# Patient Record
Sex: MALE | Race: BLACK OR AFRICAN AMERICAN | Employment: OTHER | URBAN - METROPOLITAN AREA
[De-identification: names, ages, dates, MRNs, and addresses within clinical notes are randomized per-mention and may not be internally consistent; named-entity substitution may affect disease eponyms.]

---

## 2017-07-18 ENCOUNTER — HOSPITAL ENCOUNTER (INPATIENT)
Age: 72
LOS: 2 days | Discharge: ANOTHER HOSPITAL | DRG: 054 | End: 2017-07-20
Attending: HOSPITALIST | Admitting: HOSPITALIST
Payer: MEDICARE

## 2017-07-18 ENCOUNTER — APPOINTMENT (OUTPATIENT)
Dept: MRI IMAGING | Age: 72
DRG: 054 | End: 2017-07-18
Attending: NURSE PRACTITIONER
Payer: MEDICARE

## 2017-07-18 PROBLEM — C18.9 COLON CANCER METASTASIZED TO BRAIN (HCC): Status: ACTIVE | Noted: 2017-07-18

## 2017-07-18 PROBLEM — R50.9 FEVER: Status: ACTIVE | Noted: 2017-07-18

## 2017-07-18 PROBLEM — R09.02 HYPOXIA: Status: ACTIVE | Noted: 2017-07-18

## 2017-07-18 PROBLEM — C79.31 COLON CANCER METASTASIZED TO BRAIN (HCC): Status: ACTIVE | Noted: 2017-07-18

## 2017-07-18 PROBLEM — C18.9 METASTATIC COLON CANCER IN FEMALE (HCC): Status: ACTIVE | Noted: 2017-07-18

## 2017-07-18 LAB
ALBUMIN SERPL BCP-MCNC: 2.4 G/DL (ref 3.5–5)
ALBUMIN/GLOB SERPL: 0.6 {RATIO} (ref 1.1–2.2)
ALP SERPL-CCNC: 140 U/L (ref 45–117)
ALT SERPL-CCNC: 79 U/L (ref 12–78)
ANION GAP BLD CALC-SCNC: 8 MMOL/L (ref 5–15)
AST SERPL W P-5'-P-CCNC: 76 U/L (ref 15–37)
ATRIAL RATE: 94 BPM
BASOPHILS # BLD AUTO: 0 K/UL (ref 0–0.1)
BASOPHILS # BLD: 0 % (ref 0–1)
BILIRUB SERPL-MCNC: 0.4 MG/DL (ref 0.2–1)
BUN SERPL-MCNC: 9 MG/DL (ref 6–20)
BUN/CREAT SERPL: 11 (ref 12–20)
CALCIUM SERPL-MCNC: 9 MG/DL (ref 8.5–10.1)
CALCULATED R AXIS, ECG10: 175 DEGREES
CALCULATED T AXIS, ECG11: 155 DEGREES
CHLORIDE SERPL-SCNC: 98 MMOL/L (ref 97–108)
CO2 SERPL-SCNC: 29 MMOL/L (ref 21–32)
CREAT SERPL-MCNC: 0.83 MG/DL (ref 0.7–1.3)
DIAGNOSIS, 93000: NORMAL
DIFFERENTIAL METHOD BLD: ABNORMAL
EOSINOPHIL # BLD: 0 K/UL (ref 0–0.4)
EOSINOPHIL NFR BLD: 0 % (ref 0–7)
ERYTHROCYTE [DISTWIDTH] IN BLOOD BY AUTOMATED COUNT: 14.1 % (ref 11.5–14.5)
GLOBULIN SER CALC-MCNC: 4.2 G/DL (ref 2–4)
GLUCOSE BLD STRIP.AUTO-MCNC: 293 MG/DL (ref 65–100)
GLUCOSE BLD STRIP.AUTO-MCNC: 311 MG/DL (ref 65–100)
GLUCOSE BLD STRIP.AUTO-MCNC: 317 MG/DL (ref 65–100)
GLUCOSE BLD STRIP.AUTO-MCNC: 326 MG/DL (ref 65–100)
GLUCOSE BLD STRIP.AUTO-MCNC: 329 MG/DL (ref 65–100)
GLUCOSE BLD STRIP.AUTO-MCNC: 338 MG/DL (ref 65–100)
GLUCOSE SERPL-MCNC: 281 MG/DL (ref 65–100)
HCT VFR BLD AUTO: 28.5 % (ref 36.6–50.3)
HGB BLD-MCNC: 9.7 G/DL (ref 12.1–17)
INR PPP: 2.4 (ref 0.9–1.1)
LYMPHOCYTES # BLD AUTO: 3 % (ref 12–49)
LYMPHOCYTES # BLD: 0.4 K/UL (ref 0.8–3.5)
MCH RBC QN AUTO: 30.6 PG (ref 26–34)
MCHC RBC AUTO-ENTMCNC: 34 G/DL (ref 30–36.5)
MCV RBC AUTO: 89.9 FL (ref 80–99)
METAMYELOCYTES NFR BLD MANUAL: 1 %
MONOCYTES # BLD: 0.4 K/UL (ref 0–1)
MONOCYTES NFR BLD AUTO: 3 % (ref 5–13)
MYELOCYTES NFR BLD MANUAL: 4 %
NEUTS BAND NFR BLD MANUAL: 6 % (ref 0–6)
NEUTS SEG # BLD: 10.7 K/UL (ref 1.8–8)
NEUTS SEG NFR BLD AUTO: 83 % (ref 32–75)
P-R INTERVAL, ECG05: 122 MS
PLATELET # BLD AUTO: 221 K/UL (ref 150–400)
POTASSIUM SERPL-SCNC: 4.2 MMOL/L (ref 3.5–5.1)
PROT SERPL-MCNC: 6.6 G/DL (ref 6.4–8.2)
PROTHROMBIN TIME: 24.9 SEC (ref 9–11.1)
Q-T INTERVAL, ECG07: 336 MS
QRS DURATION, ECG06: 80 MS
QTC CALCULATION (BEZET), ECG08: 420 MS
RBC # BLD AUTO: 3.17 M/UL (ref 4.1–5.7)
RBC MORPH BLD: ABNORMAL
RBC MORPH BLD: ABNORMAL
SERVICE CMNT-IMP: ABNORMAL
SODIUM SERPL-SCNC: 135 MMOL/L (ref 136–145)
VENTRICULAR RATE, ECG03: 94 BPM
WBC # BLD AUTO: 12 K/UL (ref 4.1–11.1)

## 2017-07-18 PROCEDURE — 85025 COMPLETE CBC W/AUTO DIFF WBC: CPT | Performed by: HOSPITALIST

## 2017-07-18 PROCEDURE — 97116 GAIT TRAINING THERAPY: CPT

## 2017-07-18 PROCEDURE — 74011636637 HC RX REV CODE- 636/637: Performed by: HOSPITALIST

## 2017-07-18 PROCEDURE — 93005 ELECTROCARDIOGRAM TRACING: CPT

## 2017-07-18 PROCEDURE — 97161 PT EVAL LOW COMPLEX 20 MIN: CPT

## 2017-07-18 PROCEDURE — 74011250636 HC RX REV CODE- 250/636: Performed by: HOSPITALIST

## 2017-07-18 PROCEDURE — 74011000258 HC RX REV CODE- 258: Performed by: HOSPITALIST

## 2017-07-18 PROCEDURE — 93306 TTE W/DOPPLER COMPLETE: CPT

## 2017-07-18 PROCEDURE — 97166 OT EVAL MOD COMPLEX 45 MIN: CPT

## 2017-07-18 PROCEDURE — 65660000000 HC RM CCU STEPDOWN

## 2017-07-18 PROCEDURE — 80053 COMPREHEN METABOLIC PANEL: CPT | Performed by: HOSPITALIST

## 2017-07-18 PROCEDURE — 85610 PROTHROMBIN TIME: CPT | Performed by: HOSPITALIST

## 2017-07-18 PROCEDURE — 87040 BLOOD CULTURE FOR BACTERIA: CPT | Performed by: HOSPITALIST

## 2017-07-18 PROCEDURE — 74011000250 HC RX REV CODE- 250: Performed by: HOSPITALIST

## 2017-07-18 PROCEDURE — 36415 COLL VENOUS BLD VENIPUNCTURE: CPT | Performed by: HOSPITALIST

## 2017-07-18 PROCEDURE — 74011250637 HC RX REV CODE- 250/637: Performed by: NURSE PRACTITIONER

## 2017-07-18 PROCEDURE — 74011250637 HC RX REV CODE- 250/637: Performed by: HOSPITALIST

## 2017-07-18 PROCEDURE — 82962 GLUCOSE BLOOD TEST: CPT

## 2017-07-18 PROCEDURE — 97535 SELF CARE MNGMENT TRAINING: CPT

## 2017-07-18 RX ORDER — MAGNESIUM SULFATE 100 %
4 CRYSTALS MISCELLANEOUS AS NEEDED
Status: DISCONTINUED | OUTPATIENT
Start: 2017-07-18 | End: 2017-07-21 | Stop reason: HOSPADM

## 2017-07-18 RX ORDER — IPRATROPIUM BROMIDE AND ALBUTEROL SULFATE 2.5; .5 MG/3ML; MG/3ML
3 SOLUTION RESPIRATORY (INHALATION)
Status: DISCONTINUED | OUTPATIENT
Start: 2017-07-18 | End: 2017-07-21 | Stop reason: HOSPADM

## 2017-07-18 RX ORDER — DOCUSATE SODIUM 100 MG/1
100 CAPSULE, LIQUID FILLED ORAL 2 TIMES DAILY
Status: DISCONTINUED | OUTPATIENT
Start: 2017-07-18 | End: 2017-07-21 | Stop reason: HOSPADM

## 2017-07-18 RX ORDER — DILTIAZEM HYDROCHLORIDE 120 MG/1
120 CAPSULE, COATED, EXTENDED RELEASE ORAL DAILY
Status: DISCONTINUED | OUTPATIENT
Start: 2017-07-18 | End: 2017-07-21 | Stop reason: HOSPADM

## 2017-07-18 RX ORDER — WARFARIN 1 MG/1
1 TABLET ORAL DAILY
COMMUNITY

## 2017-07-18 RX ORDER — VANCOMYCIN/0.9 % SOD CHLORIDE 1.5G/250ML
1500 PLASTIC BAG, INJECTION (ML) INTRAVENOUS EVERY 12 HOURS
Status: DISCONTINUED | OUTPATIENT
Start: 2017-07-18 | End: 2017-07-20

## 2017-07-18 RX ORDER — INSULIN GLARGINE 100 [IU]/ML
10 INJECTION, SOLUTION SUBCUTANEOUS
Status: DISCONTINUED | OUTPATIENT
Start: 2017-07-18 | End: 2017-07-19

## 2017-07-18 RX ORDER — CARVEDILOL 12.5 MG/1
25 TABLET ORAL 2 TIMES DAILY WITH MEALS
Status: DISCONTINUED | OUTPATIENT
Start: 2017-07-18 | End: 2017-07-21 | Stop reason: HOSPADM

## 2017-07-18 RX ORDER — DEXAMETHASONE 4 MG/1
4 TABLET ORAL 2 TIMES DAILY WITH MEALS
COMMUNITY

## 2017-07-18 RX ORDER — LANOLIN ALCOHOL/MO/W.PET/CERES
1 CREAM (GRAM) TOPICAL
Status: DISCONTINUED | OUTPATIENT
Start: 2017-07-18 | End: 2017-07-21 | Stop reason: HOSPADM

## 2017-07-18 RX ORDER — ENOXAPARIN SODIUM 100 MG/ML
1 INJECTION SUBCUTANEOUS EVERY 12 HOURS
Status: DISCONTINUED | OUTPATIENT
Start: 2017-07-18 | End: 2017-07-18

## 2017-07-18 RX ORDER — SODIUM CHLORIDE 0.9 % (FLUSH) 0.9 %
5-10 SYRINGE (ML) INJECTION AS NEEDED
Status: DISCONTINUED | OUTPATIENT
Start: 2017-07-18 | End: 2017-07-21 | Stop reason: HOSPADM

## 2017-07-18 RX ORDER — LEVETIRACETAM 500 MG/1
500 TABLET ORAL 2 TIMES DAILY
COMMUNITY

## 2017-07-18 RX ORDER — SODIUM CHLORIDE 0.9 % (FLUSH) 0.9 %
5-10 SYRINGE (ML) INJECTION EVERY 8 HOURS
Status: DISCONTINUED | OUTPATIENT
Start: 2017-07-18 | End: 2017-07-21 | Stop reason: HOSPADM

## 2017-07-18 RX ORDER — AMLODIPINE BESYLATE 10 MG/1
10 TABLET ORAL DAILY
COMMUNITY

## 2017-07-18 RX ORDER — DEXAMETHASONE SODIUM PHOSPHATE 4 MG/ML
4 INJECTION, SOLUTION INTRA-ARTICULAR; INTRALESIONAL; INTRAMUSCULAR; INTRAVENOUS; SOFT TISSUE EVERY 8 HOURS
Status: DISCONTINUED | OUTPATIENT
Start: 2017-07-18 | End: 2017-07-21 | Stop reason: HOSPADM

## 2017-07-18 RX ORDER — ONDANSETRON 4 MG/1
4 TABLET, ORALLY DISINTEGRATING ORAL
Status: DISCONTINUED | OUTPATIENT
Start: 2017-07-18 | End: 2017-07-21 | Stop reason: HOSPADM

## 2017-07-18 RX ORDER — SODIUM CHLORIDE 9 MG/ML
75 INJECTION, SOLUTION INTRAVENOUS CONTINUOUS
Status: DISCONTINUED | OUTPATIENT
Start: 2017-07-18 | End: 2017-07-21 | Stop reason: HOSPADM

## 2017-07-18 RX ORDER — DEXTROSE 50 % IN WATER (D50W) INTRAVENOUS SYRINGE
12.5-25 AS NEEDED
Status: DISCONTINUED | OUTPATIENT
Start: 2017-07-18 | End: 2017-07-18 | Stop reason: CLARIF

## 2017-07-18 RX ORDER — INSULIN LISPRO 100 [IU]/ML
INJECTION, SOLUTION INTRAVENOUS; SUBCUTANEOUS
Status: DISCONTINUED | OUTPATIENT
Start: 2017-07-18 | End: 2017-07-21 | Stop reason: HOSPADM

## 2017-07-18 RX ORDER — LOVASTATIN 20 MG/1
10 TABLET ORAL DAILY
Status: DISCONTINUED | OUTPATIENT
Start: 2017-07-18 | End: 2017-07-21 | Stop reason: HOSPADM

## 2017-07-18 RX ORDER — HYDROCHLOROTHIAZIDE 25 MG/1
12.5 TABLET ORAL DAILY
Status: DISCONTINUED | OUTPATIENT
Start: 2017-07-18 | End: 2017-07-21 | Stop reason: HOSPADM

## 2017-07-18 RX ORDER — LEVETIRACETAM 500 MG/1
500 TABLET ORAL 2 TIMES DAILY
Status: DISCONTINUED | OUTPATIENT
Start: 2017-07-18 | End: 2017-07-21 | Stop reason: HOSPADM

## 2017-07-18 RX ORDER — HYDROCODONE BITARTRATE AND ACETAMINOPHEN 5; 325 MG/1; MG/1
1 TABLET ORAL
Status: DISCONTINUED | OUTPATIENT
Start: 2017-07-18 | End: 2017-07-21 | Stop reason: HOSPADM

## 2017-07-18 RX ORDER — LISINOPRIL 20 MG/1
20 TABLET ORAL DAILY
Status: DISCONTINUED | OUTPATIENT
Start: 2017-07-18 | End: 2017-07-21 | Stop reason: HOSPADM

## 2017-07-18 RX ORDER — ACETAMINOPHEN 325 MG/1
650 TABLET ORAL
Status: DISCONTINUED | OUTPATIENT
Start: 2017-07-18 | End: 2017-07-21 | Stop reason: HOSPADM

## 2017-07-18 RX ORDER — VANCOMYCIN 2 GRAM/500 ML IN 0.9 % SODIUM CHLORIDE INTRAVENOUS
2000 ONCE
Status: COMPLETED | OUTPATIENT
Start: 2017-07-18 | End: 2017-07-18

## 2017-07-18 RX ADMIN — PIPERACILLIN SODIUM,TAZOBACTAM SODIUM 3.38 G: 3; .375 INJECTION, POWDER, FOR SOLUTION INTRAVENOUS at 07:07

## 2017-07-18 RX ADMIN — VANCOMYCIN HYDROCHLORIDE 1500 MG: 10 INJECTION, POWDER, LYOPHILIZED, FOR SOLUTION INTRAVENOUS at 20:10

## 2017-07-18 RX ADMIN — DILTIAZEM HYDROCHLORIDE 120 MG: 120 CAPSULE, EXTENDED RELEASE ORAL at 08:42

## 2017-07-18 RX ADMIN — SODIUM CHLORIDE 75 ML/HR: 900 INJECTION, SOLUTION INTRAVENOUS at 06:59

## 2017-07-18 RX ADMIN — LOVASTATIN 10 MG: 20 TABLET ORAL at 08:03

## 2017-07-18 RX ADMIN — INSULIN LISPRO 7 UNITS: 100 INJECTION, SOLUTION INTRAVENOUS; SUBCUTANEOUS at 07:37

## 2017-07-18 RX ADMIN — CARVEDILOL 25 MG: 12.5 TABLET, FILM COATED ORAL at 17:02

## 2017-07-18 RX ADMIN — LISINOPRIL 20 MG: 20 TABLET ORAL at 08:03

## 2017-07-18 RX ADMIN — PIPERACILLIN SODIUM,TAZOBACTAM SODIUM 3.38 G: 3; .375 INJECTION, POWDER, FOR SOLUTION INTRAVENOUS at 13:27

## 2017-07-18 RX ADMIN — DEXAMETHASONE SODIUM PHOSPHATE 4 MG: 4 INJECTION INTRA-ARTICULAR; INTRALESIONAL; INTRAMUSCULAR; INTRAVENOUS; SOFT TISSUE at 13:33

## 2017-07-18 RX ADMIN — DEXAMETHASONE SODIUM PHOSPHATE 4 MG: 4 INJECTION INTRA-ARTICULAR; INTRALESIONAL; INTRAMUSCULAR; INTRAVENOUS; SOFT TISSUE at 21:49

## 2017-07-18 RX ADMIN — LEVETIRACETAM 500 MG: 500 TABLET ORAL at 17:02

## 2017-07-18 RX ADMIN — HYDROCHLOROTHIAZIDE 12.5 MG: 25 TABLET ORAL at 10:39

## 2017-07-18 RX ADMIN — CARVEDILOL 25 MG: 12.5 TABLET, FILM COATED ORAL at 10:39

## 2017-07-18 RX ADMIN — VANCOMYCIN HYDROCHLORIDE 2000 MG: 10 INJECTION, POWDER, LYOPHILIZED, FOR SOLUTION INTRAVENOUS at 08:22

## 2017-07-18 RX ADMIN — DEXAMETHASONE SODIUM PHOSPHATE 4 MG: 4 INJECTION INTRA-ARTICULAR; INTRALESIONAL; INTRAMUSCULAR; INTRAVENOUS; SOFT TISSUE at 07:08

## 2017-07-18 RX ADMIN — INSULIN LISPRO 7 UNITS: 100 INJECTION, SOLUTION INTRAVENOUS; SUBCUTANEOUS at 17:01

## 2017-07-18 RX ADMIN — INSULIN LISPRO 4 UNITS: 100 INJECTION, SOLUTION INTRAVENOUS; SUBCUTANEOUS at 21:48

## 2017-07-18 RX ADMIN — INSULIN LISPRO 7 UNITS: 100 INJECTION, SOLUTION INTRAVENOUS; SUBCUTANEOUS at 12:03

## 2017-07-18 RX ADMIN — DOCUSATE SODIUM 100 MG: 100 CAPSULE, LIQUID FILLED ORAL at 08:42

## 2017-07-18 RX ADMIN — LEVETIRACETAM 500 MG: 500 TABLET ORAL at 08:03

## 2017-07-18 RX ADMIN — FERROUS SULFATE TAB 325 MG (65 MG ELEMENTAL FE) 325 MG: 325 (65 FE) TAB at 08:42

## 2017-07-18 RX ADMIN — INSULIN GLARGINE 10 UNITS: 100 INJECTION, SOLUTION SUBCUTANEOUS at 21:48

## 2017-07-18 RX ADMIN — DILTIAZEM HYDROCHLORIDE 10 MG/HR: 5 INJECTION, SOLUTION INTRAVENOUS at 06:53

## 2017-07-18 RX ADMIN — PIPERACILLIN SODIUM,TAZOBACTAM SODIUM 3.38 G: 3; .375 INJECTION, POWDER, FOR SOLUTION INTRAVENOUS at 21:48

## 2017-07-18 NOTE — PROGRESS NOTES
TRANSFER - IN REPORT:    Verbal report received from [facility], RN(name) on Brandy Showers  being received from [place](unit) for urgent transfer      Report consisted of patients Situation, Background, Assessment and   Recommendations(SBAR). Information from the following report(s) SBAR, ED Summary, [facility] and Recent Results was reviewed with the receiving nurse. Opportunity for questions and clarification was provided. Assessment will be completed upon patients arrival to unit and care will be assumed.

## 2017-07-18 NOTE — PROGRESS NOTES
CM met with patient and wife at bedside- patient is transfer from [facility] - lives with wife- they have 5 children and 2 of them live locally - patient has only Medicare coverage - no prescription coverage and no secondary insurance - is open to [employer] for skilled nursing services- per PT/OT patient may benefit from rehab stay upon discharge from the hospital - provided inpt rehab listing and SNF listing to wife and L[place] , daughter - she would like to speak with the MD regarding input whether they would recommend inpt rehab vs SNF rehab - wife provides transportation needs for patient. NESHA Fontenot    Care Management Interventions  PCP Verified by CM:  Yes  Transition of Care Consult (CM Consult):  (inpt rehab vs SNF rehab)  MyChart Signup: Yes  Discharge Durable Medical Equipment: No (uses a cane for ambulation)  Physical Therapy Consult: Yes  Occupational Therapy Consult: Yes  Speech Therapy Consult: No  Current Support Network: Lives with Spouse, Own Home  Confirm Follow Up Transport: Family  Plan discussed with Pt/Family/Caregiver: Yes  Freedom of Choice Offered: Yes  Discharge Location  Discharge Placement: Rehab hospital/unit acute (Rehab vs SNF)

## 2017-07-18 NOTE — CONSULTS
Cardiology Consult Note      Patient Name: Teetee Cedeno  : 1945 MRN: [id number]  Date: 2017  Time: 9:36 AM    Admit Diagnosis: SVT/Hypoxia/PNA  Hypoxia    Primary Cardiologist: None    Consulting Cardiologist: Osei Pérez M.D.    Reason for Consult: SVT now ST after cardizem gtt    Requesting MD: Dr. Miley Hoffmann MD  HPI:  Teetee Cedeno is a 80 y.o. male admitted on 2017  for SVT/Hypoxia/PNA  Hypoxia. Pt has PMH of metastatic colon cancer to the kidney, liver and brain status post surgery, radiation and chemotherapy, diabetes mellitus type 2,hypertension, seizures, left leg DVT on Coumadin and hypercholesterolemia. He was directly   admitted from [facility] for hypoxia and fever. His wife brought him to hospital originally due to pt starting to have left-sided weakness and could not walk with onset the evening of . He was also confused and weak. The patient denied any cough, left-sided chest pain, palpitations, nausea, vomiting, abdominal pain, urinary complaints or shortness of breath. Reported that he lost 6 pounds over 6 months. At [facility] he was noted to have supraventricular tachycardia. (rate appears to have been as high as 200 according to vital sign records) Carotid massage was unsuccessful. He received adenosine and 2 boluses of Cardizem 20 mg. Despite that, did not resolve and he was started on Cardizem drip and converted to sinus tachycardia. He also had a CT scan, which showed right parietal 2.8 cm enhancing lesion. Neurosurgery is following. MRI brain planned. He was given IV Zosyn, oxygen support, breathing treatment, and transferred to [facility].  Cardiology is consulted due to previous SVT. Subjective:  Pt currently denies any chest pain, SOB, palpitations, chills, N&V.   States he never has any chest pain, does get SOB with walking to bathroom but not walking much due to extreme fatigue. Does report he has palpitations from time to time. No dizziness. Reports noticeable BLE edema over last few months. No dysphagia but has poor appetite. No numbness or tingling. Denies abdominal pain. Last BM yesterday. Wife at bedside cares for him at home. States he has no past cardiac history except hypertension. Does not have a cardiologist.  Last chemotherapy was in 2016 and completed last radiation treatment in May 2017. Quit smoking 30 years ago. Assessment and Plan     1. Hx of SVT- at [address] resolved s/p cardizem gtt. Now NSR on telemetry- HR 90's.   -Continue cardizem gtt for today (rate currently at 10 mg/hr)  -Continue cardizem  mg daily  -Add Coreg 25 mg BID  -Continue telemetry monitoring.  -Will follow up TTE ordered for today. 2. Hx of HTN:  Uncontrolled. -Continue Cardizem CD as above  -Continue Lisinopril 20 mg daily  -Add Coreg 25 mg BID  -Add HCTZ 12.5 mg daily    3. Hx of HLD:  -Continue Mevacor 10 mg daily    3. Hx of LLE DVT:    -On lovenox 1 mg/kg sub q (coumadin on hold for now in case a procedure needed)  INR=2.3    4. Hx of DM type II: uncontrolled. -Management per primary team.    5. Metastatic colon cancer: (mets to brain and liver). On decadron. 6. Leukocytosis/possible sepsis:  WBC 12. T max 100.6 today. Antibiotics and management per primary team.    7. Anemia:  hgb 9.7    8. Advanced directives: pt has no advance directives per chart. Currently full code status. Pt with SVT at [address], converted to ST/NSR after cardizem gtt. Pt also with uncontrolled HTN- continue Cardizem gtt for today, Continue Cardizem CD and lisinopril, add Coreg and HCTZ. Will follow up TTE. Cardiology Attending:Patient seen and examined. I agree with NP assessment and plans. Adding meds as above.     Viki Liriano MD 7/18/2017 12:16 PM       Review of Symptoms:  Pertinent items are noted in HPI. and subjective    Previous treatment/evaluation includes none found . Cardiac risk factors: smoking/ tobacco exposure, dyslipidemia, diabetes mellitus, obesity, sedentary life style, male gender, hypertension. Past Medical hx:  Metastatic colon cancer (brain and liver)  HTN  HLD  LLE DVT  Seizures    Past surgical hx:  Rt mediport implant.     Current Facility-Administered Medications   Medication Dose Route Frequency    lisinopril (PRINIVIL, ZESTRIL) tablet 20 mg  20 mg Oral DAILY    dilTIAZem CD (CARDIZEM CD) capsule 120 mg  120 mg Oral DAILY    ferrous sulfate tablet 325 mg  1 Tab Oral DAILY WITH BREAKFAST    lovastatin (MEVACOR) tablet 10 mg  10 mg Oral DAILY    levETIRAcetam (KEPPRA) tablet 500 mg  500 mg Oral BID    dexamethasone (DECADRON) 4 mg/mL injection 4 mg  4 mg IntraVENous Q8H    piperacillin-tazobactam (ZOSYN) 3.375 g in 0.9% sodium chloride (MBP/ADV) 100 mL  3.375 g IntraVENous Q8H    insulin lispro (HUMALOG) injection   SubCUTAneous AC&HS    glucose chewable tablet 16 g  4 Tab Oral PRN    glucagon (GLUCAGEN) injection 1 mg  1 mg IntraMUSCular PRN    0.9% sodium chloride infusion  75 mL/hr IntraVENous CONTINUOUS    dilTIAZem (CARDIZEM) 125 mg in dextrose 5% 125 mL infusion  10 mg/hr IntraVENous TITRATE    acetaminophen (TYLENOL) tablet 650 mg  650 mg Oral Q4H PRN    HYDROcodone-acetaminophen (NORCO) 5-325 mg per tablet 1 Tab  1 Tab Oral Q4H PRN    ondansetron (ZOFRAN ODT) tablet 4 mg  4 mg Oral Q4H PRN    docusate sodium (COLACE) capsule 100 mg  100 mg Oral BID    albuterol-ipratropium (DUO-NEB) 2.5 MG-0.5 MG/3 ML  3 mL Nebulization Q4H PRN    dextrose 10 % infusion 125-250 mL  125-250 mL IntraVENous PRN    piperacillin-tazobactam (ZOSYN) 3.375 g in 0.9% sodium chloride (MBP/ADV) 100 mL  3.375 g IntraVENous NOW    vancomycin (VANCOCIN) 2000 mg in  ml infusion  2,000 mg IntraVENous ONCE    vancomycin dosing by pharmacy   Other Rx Dosing/Monitoring    carvedilol (COREG) tablet 25 mg  25 mg Oral BID WITH MEALS    hydroCHLOROthiazide (HYDRODIURIL) tablet 12.5 mg  12.5 mg Oral DAILY       No Known Allergies   No family history on file. Social History     Social History    Marital status:      Spouse name: N/A    Number of children: N/A    Years of education: N/A     Social History Main Topics    Smoking status: Not on file    Smokeless tobacco: Not on file    Alcohol use Not on file    Drug use: Not on file    Sexual activity: Not on file     Other Topics Concern    Not on file     Social History Narrative     Social hx:  Former smoker- quit 30 years ago  No ETOH use-  Lives with wife who cares for him at home. 5 children, 1  from leukemia    Family hx:  Mother/father- he is unaware of their PMH- no known heart disease per his wife. No family hx of cardiac disease    Objective:    Physical Exam    Vitals:   Vitals:    17 0503 17 0554 17 0742   BP: (!) 173/99  (!) 184/105   Pulse: (!) 104  95   Resp: 16  18   Temp: (!) 100.6 °F (38.1 °C)  99.7 °F (37.6 °C)   SpO2: 98%  99%   Weight:  114.7 kg (252 lb 13.9 oz)        General:    Alert, cooperative, no distress, appears stated age. Ears:  [place]   Neck:   Supple,  no JVD. Back:     Not examined    Lungs:     Few faint crackles bilateral bases, no use of accessory muscles noted. Heart[de-identified]    Regular rate and rhythm, S1, S2 normal, no murmur, click, rub or gallop. Abdomen:    Obese, Soft, non-tender. Bowel sounds present. No masses,  No      organomegaly. Extremities:   1+ BLE edema   Vascular:   Pulses - 1+ Rt DP, 2+ Left DP. Skin:   Skin color normal. No rashes or lesions   Neurologic:   Disoriented to place.         Telemetry: NSR- HR 90's    ECG: Today: 2017 06:14:37 [address]   Suspect arm lead reversal, interpretation assumes no reversal  Sinus rhythm with occasional premature ventricular complexes  Lateral infarct , age undetermined  No previous ECGs available  Confirmed by Vivek Gregorio MD, Monserrat ([id number]) on 7/18/2017 8:35:17 AM  Data Review:     Radiology:   No results for input(s): CPK, TROIQ in the last 72 hours. No lab exists for component: CKQMB, CPKMB, BMPP  Recent Labs      07/18/17   0645   NA  135*   K  4.2   CL  98   CO2  29   BUN  9   CREA  0.83   GLU  281*   CA  9.0     Recent Labs      07/18/17   0645   WBC  12.0*   HGB  9.7*   HCT  28.5*   PLT  221     Recent Labs      07/18/17   0645   PTP  24.9*   INR  2.4*   SGOT  76*   AP  140*     No results for input(s): CHOL, LDLC in the last 72 hours. No lab exists for component: TGL, HDLC,  HBA1C  No results for input(s): CRP, TSH, TSHEXT in the last 72 hours. No lab exists for component: ESR    Thank you very much for this referral. I appreciate the opportunity to participate in this patient's care. I will follow along with above stated plan. Kel Trejo.  JOHANN Samuel         [facility] [address] [place] [address] Rd[address] [facility] [address], [address] [facility] [address]     [place]     [phone]    Tisha Ornelas MD

## 2017-07-18 NOTE — PROGRESS NOTES
Pharmacist Note - Vancomycin Dosing    Consult provided for this 80 y.o. male for indication of fever and hypoxia  -admitted from [facility] ED  -metastatic colon cancer to the kidney, liver and brain status post surgery, radiation and chemotherapy    Antibiotic regimen: Vancomycin and Zosyn    Recent Labs      17   0645   WBC  12.0*   CREA  0.83   BUN  9     Frequency of BMP: daily thru   Height: 185 cm (per prior records)  Weight: 114.7 kg  Est CrCl: ~95 ml/min; UO: - ml/kg/hr  Temp (24hrs), Av.2 °F (37.9 °C), Min:99.7 °F (37.6 °C), Max:100.6 °F (38.1 °C)    Cultures:   blood - pending    Goal trough = 15 - 20 mcg/mL    Therapy will be initiated with a loading dose of 2000 mg IV x 1 to be followed by a maintenance dose of 1500 mg IV every 12 hours. Pharmacy to follow patient daily and order levels / make dose adjustments as appropriate.

## 2017-07-18 NOTE — PROGRESS NOTES
Full note to follow  Left kuldeep neglect  Radiated to brain earlier this year according to wife  Will await MRI.  Surgery may be best option to treat brain disease if they wish to proceed with that option  Will need to be off coumadin first

## 2017-07-18 NOTE — PROGRESS NOTES
Spiritual Care Assessment/Progress Notes    Sabine Pickard [id number]  [id number]    1945  80 y.o.  male    Patient Telephone Number: [phone] (home)   Baptism Affiliation: Sanaz Rosen   Language: English   Extended Emergency Contact Information  Primary Emergency Contact: Abdifatah Anton  Address: [address] Phone: [phone]  Relation: Spouse   Patient Active Problem List    Diagnosis Date Noted    Hypoxia 07/18/2017    Colon cancer metastasized to brain [facility]) 07/18/2017    Fever 07/18/2017        Date: 7/18/2017       Level of Baptism/Spiritual Activity:  [x]         Involved in munir tradition/spiritual practice    []         Not involved in munir tradition/spiritual practice  [x]         Spiritually oriented    []         Claims no spiritual orientation    []         seeking spiritual identity  []         Feels alienated from Uatsdin practice/tradition  []         Feels angry about Uatsdin practice/tradition  [x]         Spirituality/Uatsdin tradition IS a resource for coping at this time.   []         Not able to assess due to medical condition    Services Provided Today:  []         crisis intervention    []         reading Scriptures  [x]         spiritual assessment    []         prayer  [x]         empathic listening/emotional support  []         rites and rituals (cite in comments)  []         life review     []         Uatsdin support  []         theological development   []         advocacy  []         ethical dialog     []         blessing  []         bereavement support    [x]         support to family  []         anticipatory grief support   [x]         help with AMD  []         spiritual guidance    []         meditation      Spiritual Care Needs  [x]         Emotional Support  [x]         Spiritual/Baptism Care  []         Loss/Adjustment  []         Advocacy/Referral                /Ethics  []         No needs expressed at               this time  []         Other: (note in               comments)  Spiritual Care Plan  []         Follow up visits with               pt/family  []         Provide materials  []         Schedule sacraments  []         Contact Community               Clergy  [x]         Follow up as needed  []         Other: (note in               comments)     Comments: Saw Mr Lester Ibarra in room 462 in regards to an AMD consult, requested by his wife. Mr Lester Ibarra was sitting up in bed and appeared relaxed at time of 's arrival. Mr Lester Ibarra was oriented to person & year but thought that he was in [facility] 12 that it was June; he also stated that the two people with him were his daughters but it was his wife and his daughter. Informed patient's wife that patient would need to be able to fully comprehend information about an AMD in order to complete the document. Wife acknowledged that information.  then briefly reviewed with the wife the various sections of an AMD and the purpose of each. [address] [place] [address] left a copy of a blank AMD and the booklet, \"Your Right to Decide\" with the wife for her review. Encouraged her to have [address] [place] [address] notified when patient was better oriented so that AMD could be reviewed with him. Wife shared that patient was a  of [employer], that had about 30 members. Shared that he had been there about a year and the Anglican was planning a celebration for the end of July; they were hoping that he would be better by then and be able to attend. Assured patient and family of prayers on their behalf and of ongoing  availability for patient/family support as needed/able. : Rev. Abdias Grossman.  Thalia Oswald; [facility], to [facility] [phone] [facility] call: [phone]

## 2017-07-18 NOTE — PROGRESS NOTES
Spiritual Care Partner Volunteer visited patient in [place] [address] on 7/18/17. Documented by:  Pamella Farrell M.Div.    [facility] [phone])

## 2017-07-18 NOTE — PROGRESS NOTES
0500: TRANSFER - IN REPORT:    Verbal report received from [facility]) on Janet Round  being received from [facility] (unit) for routine progression of care      Report consisted of patients Situation, Background, Assessment and   Recommendations(SBAR). Information from the following report(s) SBAR and ED Summary was reviewed with the receiving nurse. Opportunity for questions and clarification was provided. Assessment completed upon patients arrival to unit and care assumed. Patient put on tele. Dr Josep ahuja. Wife, Floy Stain at bedside. No c/o pain. 0800: Bedside shift change report given to Anya Lennon (oncoming nurse) by Thea Becerril (offgoing nurse). Report included the following information SBAR, Kardex, Intake/Output, MAR and Recent Results.

## 2017-07-18 NOTE — PROGRESS NOTES
Problem: Discharge Planning  Goal: *Discharge to safe environment  Outcome: Progressing Towards Goal  See cm notes.  NESHA Hyde

## 2017-07-18 NOTE — ACP (ADVANCE CARE PLANNING)
Saw Mr Jakub Okeefe in room 462 in regards to an AMD consult, requested by his wife. Mr Jakub Okeefe was sitting up in bed and appeared relaxed at time of 's arrival. Mr Jakub Okeefe was oriented to person & year but thought that he was in [facility] 12 that it was June; he also stated that the two people with him were his daughters but it was his wife and his daughter. Informed patient's wife that patient would need to be able to fully comprehend information about an AMD in order to complete the document. Wife acknowledged that information.  then briefly reviewed with the wife the various sections of an AMD and the purpose of each. Angelica Fernandez left a copy of a blank AMD and the booklet, \"Your Right to Decide\" with the wife for her review. Encouraged her to have [facility] Arnold alvaro notified when patient was better oriented so that AMD could be reviewed with him. : Rev. Macho Quesada.  Dion Tierney; [facility], to contact [phone] [address] call: [phone]

## 2017-07-18 NOTE — PROGRESS NOTES
Hospitalist Progress Note  Anastasiya Hunt MD  Office: [phone]        Date of Service:  2017  NAME:  Roger Hauser  :  1945  MRN:  [id number]      Admission Summary:   The patient is a 81-year-old [facility] American male with past medical history significant for metastatic colon cancer to the kidney, liver and brain status post surgery, radiation and chemotherapy, diabetes mellitus type 2,  hypertension, seizures, left leg DVT on Coumadin and hypercholesterolemia, who is directly admitted from [facility] Emergency Department for hypoxia and fever. The patient gives little information, but his wife at the bedside gave most of the information. His wife stated that last evening he started to have left-sided weakness and could not walk      Interval history / Subjective:    Alert and awake. No complaints. Family at bedside. Assessment & Plan:   Left sided kuldeep neglect and hemiparesis with metabolic encephalopathy due to metastatic cancer. -IV steroid. Neurology evaluated. Pending MRI for further decision,may need surgery    Low grade fever and leukocytosis  -No evidence of apparent infection,no UA sent  -Cancer patient and at risk for infection,continue empiric antibiotics for now until cultures are negative for 28 to 72 hours. Diabetes mellitus type 2.    -hyperglycemia due to steroids. Mild Hypoxia on 2 LPM,wean as able. Metastatic colon cancer to the liver, brain and kidney. Hypertension, uncontrolled. Hypercholesterolemia. Supraventricular tachycardia. On Cardizem and coreg. Hypomagnesemia,replace. recheck. History of seizure disorder. On keppra. History of left leg deep venous thrombosis. SCD. Anticoagulation is unsafe  With active symptomatic brain metastasis.       Code status:full  DVT prophylaxis: scd    Care Plan discussed with: Patient/Family and Nurse  Disposition: TBD    Hospital Problems Date Reviewed: 7/18/2017          Codes Class Noted POA    * (Principal)Hypoxia ICD-10-CM: R09.02  ICD-9-CM: 799.02  7/18/2017 Unknown        Colon cancer metastasized to brain [facility]) ICD-10-CM: C18.9, C79.31  ICD-9-CM: 153.9, 198.3  7/18/2017 Unknown        Fever ICD-10-CM: R50.9  ICD-9-CM: 780.60  7/18/2017 Unknown                Review of Systems:   Review of systems not obtained due to patient factors. Vital Signs:    Last 24hrs VS reviewed since prior progress note. Most recent are:  Visit Vitals    /73 (BP 1 Location: Right arm, BP Patient Position: At rest)    Pulse 67    Temp 99.1 °F (37.3 °C)    Resp 18    Ht 6' 1\" (1.854 m)    Wt 114.7 kg (252 lb 13.9 oz)    SpO2 100%    BMI 33.36 kg/m2         Intake/Output Summary (Last 24 hours) at 07/18/17 1816  Last data filed at 07/18/17 1752   Gross per 24 hour   Intake           1215.5 ml   Output             1025 ml   Net            190.5 ml        Physical Examination:             Constitutional:  No acute distress, alert. Neglects left side. ENT:  Oral mucous moist, oropharynx benign. Neck supple,    Resp:  CTA bilaterally. No wheezing/rhonchi/rales. No accessory muscle use   CV:  Regular rhythm, normal rate, no murmurs, gallops, rubs    GI:  Soft, non distended, non tender. normoactive bowel sounds, no hepatosplenomegaly     Musculoskeletal:  No edema, warm, 2+ pulses throughout    Neurologic: Left side weakness,neglect. Alert and awake. 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      Hematologic/Lymphatic/Immunlogic:  No jaundice nor lymph node swelling       Data Review:    Review and/or order of clinical lab test  Review and/or order of tests in the radiology section of CPT  Review and/or order of tests in the medicine section of CPT      Labs:     Recent Labs      07/18/17   0645   WBC  12.0*   HGB  9.7*   HCT  28.5*   PLT  221     Recent Labs      07/18/17   0645   NA  135*   K  4.2   CL  98   CO2  29   BUN  9   CREA  0.83   GLU  281*   CA  9.0     Recent Labs      07/18/17   0645   SGOT  76*   ALT  79*   AP  140*   TBILI  0.4   TP  6.6   ALB  2.4*   GLOB  4.2*     Recent Labs      07/18/17   0645   INR  2.4*   PTP  24.9*      No results for input(s): FE, TIBC, PSAT, FERR in the last 72 hours. No results found for: FOL, RBCF   No results for input(s): PH, PCO2, PO2 in the last 72 hours. No results for input(s): CPK, CKNDX, TROIQ in the last 72 hours.     No lab exists for component: CPKMB  No results found for: CHOL, CHOLX, CHLST, CHOLV, HDL, LDL, LDLC, DLDLP, TGLX, TRIGL, TRIGP, CHHD, CHHDX  Lab Results   Component Value Date/Time    Glucose (POC) 338 07/18/2017 04:50 PM    Glucose (POC) 329 07/18/2017 11:57 AM    Glucose (POC) 317 07/18/2017 11:45 AM    Glucose (POC) 311 07/18/2017 07:33 AM    Glucose (POC) 293 07/18/2017 05:05 AM     No results found for: COLOR, APPRN, SPGRU, REFSG, ROCHELLE, PROTU, GLUCU, KETU, BILU, UROU, MELINA, LEUKU, GLUKE, EPSU, BACTU, WBCU, RBCU, CASTS, UCRY      Medications Reviewed:     Current Facility-Administered Medications   Medication Dose Route Frequency    lisinopril (PRINIVIL, ZESTRIL) tablet 20 mg  20 mg Oral DAILY    dilTIAZem CD (CARDIZEM CD) capsule 120 mg  120 mg Oral DAILY    ferrous sulfate tablet 325 mg  1 Tab Oral DAILY WITH BREAKFAST    lovastatin (MEVACOR) tablet 10 mg  10 mg Oral DAILY    levETIRAcetam (KEPPRA) tablet 500 mg  500 mg Oral BID    dexamethasone (DECADRON) 4 mg/mL injection 4 mg  4 mg IntraVENous Q8H    insulin lispro (HUMALOG) injection   SubCUTAneous AC&HS    glucose chewable tablet 16 g  4 Tab Oral PRN    glucagon (GLUCAGEN) injection 1 mg  1 mg IntraMUSCular PRN    0.9% sodium chloride infusion  75 mL/hr IntraVENous CONTINUOUS    dilTIAZem (CARDIZEM) 125 mg in dextrose 5% 125 mL infusion  10 mg/hr IntraVENous TITRATE    acetaminophen (TYLENOL) tablet 650 mg  650 mg Oral Q4H PRN    HYDROcodone-acetaminophen (NORCO) 5-325 mg per tablet 1 Tab  1 Tab Oral Q4H PRN    ondansetron (ZOFRAN ODT) tablet 4 mg  4 mg Oral Q4H PRN    docusate sodium (COLACE) capsule 100 mg  100 mg Oral BID    albuterol-ipratropium (DUO-NEB) 2.5 MG-0.5 MG/3 ML  3 mL Nebulization Q4H PRN    dextrose 10 % infusion 125-250 mL  125-250 mL IntraVENous PRN    vancomycin dosing by pharmacy   Other Rx Dosing/Monitoring    carvedilol (COREG) tablet 25 mg  25 mg Oral BID WITH MEALS    hydroCHLOROthiazide (HYDRODIURIL) tablet 12.5 mg  12.5 mg Oral DAILY    piperacillin-tazobactam (ZOSYN) 3.375 g in 0.9% sodium chloride (MBP/ADV) 100 mL  3.375 g IntraVENous Q8H    vancomycin (VANCOCIN) 1500 mg in  ml infusion  1,500 mg IntraVENous Q12H     ______________________________________________________________________  EXPECTED LENGTH OF STAY: 3d 19h  ACTUAL LENGTH OF STAY:          0                 Rosetta Mccormack MD

## 2017-07-18 NOTE — PROGRESS NOTES
Patient seen and examined    IMPRESSION    1. Fever    2. Metastatic colon cancer to kidneys, liver and brain    3. Left hemineglect secondary to right parietal lesion    4. DM    5. Left leg DVT     6. Recent SVT       PLAN    1. No obvious source of the fever. UA at [place] only showed 2-4 wbc. Await blood cultures. Continue vanco and zosyn for now.

## 2017-07-18 NOTE — PROGRESS NOTES
MRI TECH CALLED, PT WILL NOT SIT STILL FOR MRI. MADE A FEW SUGGESTIONS AND THEY BUT THEY WERE UNSUCCESSFUL. PT MENTAL STATUS ALL DAY HAS BEEN X1., AT BASELINE .

## 2017-07-18 NOTE — H&P
[address]   [address], [address]   HISTORY AND PHYSICAL       Name:  Tan Feliz   MR#:  [id number]   :  1945   Account #:  [de-identified]        Date of Adm:  2017       PRIMARY CARE PROVIDER:  Demar Jeffers MD     SOURCE OF INFORMATION:  The patient and his wife at the bedside. CHIEF COMPLAINT:  The patient directly admitted from [facility] for fever and hypoxia. HISTORY OF PRESENT ILLNESS:  The patient is a 64-year-old   [place] American male with past medical history significant for   metastatic colon cancer to the kidney, liver and brain status post   surgery, radiation and chemotherapy, diabetes mellitus type 2,  hypertension, seizures, left leg DVT on Coumadin and   hypercholesterolemia, who is directly admitted from [facility] for hypoxia and   fever. The patient gives little information, but his wife at the bedside   gave most of the information. His wife stated that last evening he   started to have left-sided weakness and could not walk. For this   reason, she took him to [facility] and there   he was found to have fever. He has confusion and generalized   weakness. The patient denied any cough, left-sided chest pain,   palpitations, nausea, vomiting, abdominal pain, urinary complaints or  shortness of breath. He has lost 6 pounds over 6 months. At   [facility] he was   noted to have supraventricular tachycardia. Carotid massage was   tried. He received adenosine and 2 boluses of Cardizem 20 mg. Despite that, did not resolve and he was started on Cardizem drip and   converted to sinus tachycardia. He also had a CT scan, which showed   right parietal 2.8 cm enhancing lesion.   He was given IV Zosyn, oxygen   support, breathing treatment, and transferred to [facility]. [facility].    His white blood cell count was 12.4; He also had a   chest x-ray favoured  perihilar and bibasilar atelectasis   no infiltrate, no jose edema. Per his wife, he is more confused and he   has progressive left-sided weakness. He takes Decadron and seizure   medications. When he arrived at [facility], his vital signs   were blood pressure 173/99, temperature 100.6, pulse 104, saturation   of oxygen 98% on 2 L, and he transferred on Cardizem 10 mg infusion. REVIEW OF SYSTEMS:  Pertinent positive findings mentioned in the   HPI. The patient denied any headache or blurring of vision, but he has   left-sided weakness. No vomiting or neck stiffness. No cough or   shortness of breath. Otherwise, all systems reviewed no other positive   findings. MEDICATIONS   Prior to admission medications include:    1. Benazepril 20 mg p.o. daily. 2. Diltiazem 120 mg p.o. b.i.d.   3. Ferrous sulfate 1 tab p.o. daily. 4. Lovastatin 10 mg p.o. daily. 5. Prochlorperazine 5 mg every 6 hours as needed. 6. Zofran 8 mg every 8 hours as needed. ALLERGIES:  NO KNOWN DRUG ALLERGIES. SOCIAL HISTORY:  The patient lives with his wife. He used to smoke   cigarettes and stopped 30 years ago. No alcohol abuse. Ambulates   with a cane. CODE STATUS:  FULL CODE. FAMILY HISTORY:  No family history of heart disease or cancer. Unable to obtain from the patient about his  medical conditions. ADVANCED DIRECTIVE:  Advanced care planning discussed with the   patient and wife. The patient has no advanced directives. His wife,   Sherren Grinder, contact number [phone], is the medical decision   maker when the need arises. PHYSICAL EXAMINATION   VITAL SIGNS:  Blood pressure 173/99, pulse 104, temperature   100.6, respiratory rate 16, saturation of oxygen 98%. GENERAL APPEARANCE:  The patient is alert, cooperative, not in   distress. He appears his stated age.    HEENT:  Pink conjunctivae, anicteric sclerae. Moist tongue and buccal   mucosa. LUNGS:  Decreased bronchial breath sounds. Clear to auscultation   bilaterally. CHEST WALL:  No tenderness, no deformity. CARDIOVASCULAR:  Tachycardic, regular rhythm. S1 and S2   normal.  No murmur or gallop. ABDOMEN:  Soft and nontender. Bowel sounds normal.  No mass or   organomegaly. EXTREMITIES:  No cyanosis or edema. SKIN:  No rashes or lesions. CENTRAL NERVOUS SYSTEM:  The patient is  Conscious , alert, oriented to   person. Left side neglect. Motor:  Upper extremities, right 5/5 and left   4/5. Lower extremities, right 5/5 and left 3/5. LABORATORY DATA:  White blood cell count 12.4, hemoglobin 10.7,   platelet count 380. Chemistry:  Sodium 135, potassium 3.8, chloride   97, CO2 of 26, creatinine 0.77, and magnesium 1.2. IMAGING:  X-ray from [facility], sinus   tachycardia with occasional premature ventricular complex with a rate   of 107 beats per minute. Nonspecific ST-T wave abnormalities. CT scan of the head without contrast showed abnormal enhancing   lesion within the right subcortical parietal lobe measuring   approximately 2.8 cm in maximal dimension with associated vasogenic   edema. Findings are consistent with a reported history of intracranial   metastatic disease. No additional lesion is suggested through image   quality. IMPRESSION:  The patient is [facility] 73[facility]   with past medical history significant for metastatic colon cancer,   diabetes mellitus type 2, hypertension, seizure disorder and history of   left leg deep venous thrombosis in 04/2017, directly admitted from   [facility] after he was found to have   supraventricular tachycardia, hypoxia, and right parietal lobe   enhancing brain lesion. He received antibiotics and transferred on   Cardize drip. ASSESSMENT    1. Fever, hypoxia, leukocytosis with possible sepsis POA.     2. Hypoxia possibly due to sepsis, rule out pneumonia. 3. Metastatic colon cancer to the liver, brain and kidney. 4. Hypertension, uncontrolled. 5. Hypercholesterolemia. 6. Supraventricular tachycardia. 7. Altered mental status with left-sided weakness due to metastatic   brain lesion. 8. Hypomagnesemia. 9. Diabetes mellitus type 2.    10. History of seizure disorder. 11. History of left leg deep venous thrombosis. PLAN    1. Fever, hypoxia and leukocytosis, possibly due to sepsis. The patient   is admitted in telemetry service. Will do blood culture. Will start IV   Zosyn, vancomycin and IV fluids. Will consult pharmacy for   vancomycin dosing. Followup on blood cultures. The patient has [place]   and he may have line sepsis,  will do echocardiography and will   involve infectious disease. 2. Altered mental status with left-sided weakness, likely due to   metastatic colon cancer. He had a CT scan which showed a 2.8 mm   right parietal lobe lesion. Will do MRI of the brain. IV Decadron 4 mg   q.8 hours. Continue Keppra. Will consult neurosurgeon. 3. Metastatic colon cancer to the liver, brain and kidney. The patient   has history of status post surgery, chemotherapy and radiation   treatment. Will request medical records from  [address] [place] [address] and will   consider involving an oncologist.    4. Hypertension, poorly controlled. Will resume his home benazepril   20 mg daily. He is on IV Cardizem. Will resume his home Cardizem   120 mg b.i.d. and monitor blood pressure and p.r.n. IV hydralazine. 5. SVT converted to sinus tachycardia. Resume Cardizem,   , echocardiography, EKG and consult cardiologist.   6. Hypercholesterolemia. Continue home medication lovastatin. 7. Hypomagnesemia. The patient has received 2 grams of IV   magnesium. Will check magnesium level. 8. Diabetes mellitus type 2. Monitor fingerstick glucose on a sliding   scale.     9. History of seizure disorder related to brain cancer. Resume home   Keppra 500 mg b.i.d.    10. History of left leg DVT diagnosed in 04/2017. The patient on   Coumadin. Will check INR. Will hold his Coumadin in case he may have  any procedure, will start him on Lovenox 1 mg/kg q.12 hours after INR check. Advanced care planning discussed with the patient and his wife at   the bedside. The patient does not have advanced directive. He   assigned, Susie Aldrich, as his medical decision maker when the need   arises.           MD DAVIDE Olivares / [place]   D:  07/18/2017   06:15   T:  07/18/2017   07:30   Job #:  [id number]

## 2017-07-18 NOTE — DIABETES MGMT
DTC Progress Note    Recommendations/ Comments: Pt's chart reviewed due to hyperglycemia likely due to steroids. Pt is on Dexamethasone 4mg/ml every 8 hours. Pt has required 14 units of correction insulin since admission. If appropriate, please consider:  1. Changing correction scale to resistant scale. 2. Adding A1c to patient's next blood draw. Chart reviewed on [facility]. Patient is a 80 y.o. male with known diabetes on no diabetes medications at home. A1c:   No results found for: HBA1C, HGBE8, WDC3FFGO    Recent Glucose Results: Lab Results   Component Value Date/Time     (H) 07/18/2017 06:45 AM    GLUCPOC 329 (H) 07/18/2017 11:57 AM    GLUCPOC 317 (H) 07/18/2017 11:45 AM    GLUCPOC 311 (H) 07/18/2017 07:33 AM        Lab Results   Component Value Date/Time    Creatinine 0.83 07/18/2017 06:45 AM     Estimated Creatinine Clearance: 109.9 mL/min (based on Cr of 0.83). Active Orders   Diet    DIET DIABETIC CONSISTENT CARB Regular        PO intake: Patient Vitals for the past 72 hrs:   % Diet Eaten   07/18/17 1223 50 %       Current hospital DM medication: correction scale Humalog, normal sensitivity. Will continue to follow as needed.     Thank you  Ethan Ji RD, CDE

## 2017-07-18 NOTE — PROGRESS NOTES
Occupational Therapy Goals  Initiated 7/18/2017   1. Patient will perform grooming with moderate assistance  within 7 day(s). 2.  Patient will perform visually attending to ADL objects 2/2 in midline with moderate assistance within 7 day(s). 3.  Patient will perform opening 1 large ADL container with left as stabilizer with moderate assistance  within 7 day(s). 4.  Patient will perform BSC toilet transfers with moderate assistance within 7 day(s). 5.  Patient will participate in self ROM upper extremity therapeutic exercise/activities with maximal assistance for 3 minutes within 7 day(s). 6.  Patient will improve their Fugl Alcazar score by 5 points within 7 days. 7.  Patient will perform bathing upper body max A within 7 days. Occupational Therapy EVALUATION  Patient: Porter Deluca (44 y.o. male)  Date: 7/18/2017  Primary Diagnosis: SVT/Hypoxia/PNA  Hypoxia        Precautions:   Fall (AMS, L side inattention) [place] with R ear best when low pitch and loud    ASSESSMENT :  Based on the objective data described below, the patient presents with max to total A ADLs, bed mobility moderate A, sit<>stand max to total A. ADLs limited by cardiopulmonary tolerance (174/104, HR  during activity sitting, 2L), cognition (attention to task, orientation, increased time to process and respond), neuro deficits of: left motor and visual neglect, motor planning, initiating tasks, coordination, attention to task left sided weakness, standing balance and tolerance, medical complexities and baseline [place]. Recommend with nursing patient to complete as able in order to maintain strength, endurance and independence: ADLs with supervision/setup, OOB to chair 3x/day and mobilizing to the [facility] for toileting with 3 assist OR SYDNEY lift. Thank you for your assistance. Patient will benefit from skilled intervention to address the above impairments.   Patients rehabilitation potential is considered to be Fair  Factors which may influence rehabilitation potential include:   [x]                None noted  []                Mental ability/status  []                Medical condition  []                Home/family situation and support systems  []                Safety awareness  []                Pain tolerance/management  []                Other:      PLAN :  Recommendations and Planned Interventions:  [x]                  Self Care Training                  [x]           Therapeutic Activities  [x]                  Functional Mobility Training    [x]           Cognitive Retraining  [x]                  Therapeutic Exercises           [x]           Endurance Activities  [x]                  Balance Training                   [x]           Neuromuscular Re-Education  [x]                  Visual/Perceptual Training     [x]      Home Safety Training  [x]                  Patient Education                 [x]           Family Training/Education  []                  Other (comment):    Frequency/Duration: Patient will be followed by occupational therapy 5 times a week to address goals. Discharge Recommendations: Rehab  Further Equipment Recommendations for Discharge: TBD     SUBJECTIVE:   Patient stated No I'm good Wife stating that he had not washed his face this am. Prepped cloth and then patient stating where is the wash cloth? OBJECTIVE DATA SUMMARY:   HISTORY:   No past medical history on file. No past surgical history on file.     Prior Level of Function/Home Situation: Patient sitting to shower on chair, has been receiving chemo and radiation; wife independent   Expanded or extensive additional review of patient history: B hearing aids however with chemo and radiation increased swelling throughout body and aids do not fit as well    Home Situation  Home Environment: Private residence  # Steps to Enter: 3  Rails to Enter: Yes  Hand Rails : Bilateral  Wheelchair Ramp: No  One/Two Story Residence: One story  Living Alone: No  Support Systems: Spouse/Significant Other/Partner, Family member(s)  Patient Expects to be Discharged to[de-identified] Private residence  Current DME Used/Available at Home: [address], [place], straight, Shower chair, Walker, rolling  [x]  Right hand dominant   []  Left hand dominant    EXAMINATION OF PERFORMANCE DEFICITS:  Cognitive/Behavioral Status:  Neurologic State: Confused; Eyes open to stimulus  Orientation Level:  (NT)  Cognition: Decreased attention/concentration;Decreased command following; Impaired decision making; Unable to assess (comment) (limited by [facility])  Perception: Cues to attend left visual field;Cues to attend to left side of body  Perseveration: No perseveration noted  Safety/Judgement: Decreased awareness of environment;Decreased awareness of need for safety;Decreased insight into deficits; Fall prevention;Home safety    Skin: intact PIV    Edema: girth v. Edema? Hearing: Auditory  Auditory Impairment: Hard of hearing, bilateral    Vision/Perceptual:    Tracking: Able to track right of midline; Unable to track left of midline              Visual Fields: Difficulty detecting stimulus  in left lateral quadrant; Difficulty detecting stimulus in left lower quadrant; Difficulty detecting stimulus in left upper quadrant       Acuity: Impaired near vision; Impaired far vision    Corrective Lenses: Glasses     Range of Motion:  R UE shoulder flexion 90*  AROM: Generally decreased, functional  PROM: Within functional limits L UE                      Strength:  R UE 4/5; L UE noted -2/5 elbow flex, extension, wrist flexion during transfers however not able to complete formal 2* poor command following  Strength: Generally decreased, functional (Unable to MMT 2* [place]/comprehension)                Coordination:  Coordination: Generally decreased, functional  Fine Motor Skills-Upper: Left Impaired;Right Impaired    Gross Motor Skills-Upper: Right Intact; Left Impaired    Tone & Sensation:    Tone: Normal  Sensation: Impaired (pt with pain w/d; difficult to test LT 2* hearing/comprehens)                      Balance:  Sitting: Impaired; Without support  Sitting - Static: Good (unsupported)  Sitting - Dynamic: Fair (occasional)  Standing: Impaired; With support  Standing - Static: Fair  Standing - Dynamic : Poor    Functional Mobility and Transfers for ADLs:  Bed Mobility:   Supine to Sit: Moderate assistance; Additional time;Assist x2 (increased assistance likely 2* [place]/comp) with PT  Sit to Supine: Moderate A tactile cues R UE placement on bed, A L LE  Scooting: Minimum assistance; Additional time (increased visual cues)    Transfers:   Sit to Stand: Moderate assistance; Additional time;Assist x2 (increased visual and tactile cues) EOB with PT and OT. Instruction and demonstrated sit-stand chair with max A, to bed going to L  with max A verbal and tactile cues to let go of chair R hand, tactile cues direction and physical A. Mainly 2* [place]. Functional Transfers  Toilet Transfer : Maximum assistance (infer from chair if [facility])  Shower Transfer: Total assistance (not safe at this time)    ADL Assessment:  Feeding: Maximum assistance wife and nurse fed him, max cues to open mouth, food & drink placed and patient swallowed without s/s aspiration. Oral Facial Hygiene/Grooming: Maximum assistance setup R hand wash cloth, demonstrated washing nose and mouth. Max verbal and tactile cues thoroughness R side and pushed away from L side. Brushed mouth with setup paste L hand for patient to open, don on brush in R hand and brush teeth all with total A despite verbal, tactile and physical A. Bathing: Total assistance    Upper Body Dressing: Total assistance    Lower Body Dressing: Total assistance    Toileting:  Total assistance                ADL Intervention and task modifications:                                     Cognitive Retraining  Safety/Judgement: Decreased awareness of environment;Decreased awareness of need for safety;Decreased insight into deficits; Fall prevention;Home safety    Therapeutic Exercise:      Functional Measure:   Fugl-Alcazar Assessment of Motor Recovery after Stroke:     Reflex Activity  Flexors/Biceps/Fingers: Can be elicited  Extensors/Triceps: Can be elicited  Reflex Subtotal: 4    Volitional Movement Within Synergies  Shoulder Retraction: None  Shoulder Elevation: None  Shoulder Abduction (90 degrees): None  Shoulder External Rotation: None  Elbow Flexion: None  Forearm Supination: None  Shoulder Adduction/Internal Rotation: None  Elbow Extension: None  Forearm Pronation: None  Subtotal: 0    Volitional Movement Mixing Synergies  Hand to Lumbar Spine: None  Shoulder Flexion (0-90 degrees): None  Pronation-Supination: None  Subtotal: 0    Volitional Movement With Little or No Synergy  Shoulder Abduction (0-90 degrees): None  Shoulder Flexion ( degrees): None  Pronation/Supination: None  Subtotal : 0    Normal Reflex Activity  Biceps, Triceps, Finger Flexors: Full  Subtotal : 2    Upper Extremity Total   Upper Extremity Total: 6    Wrist  Stability at 15 Degree Dorsiflexion: None  Repeated Dorsiflexion/ Volar Flexion: None  Stability at 15 Degree Dorsiflexion: None  Repeated Dorsiflexion/ Volar Flexion: None  Circumduction: None  Wrist Total: 0    Hand  Mass Flexion: None  Mass Extension: None  Grasp A: None  Grasp B: None  Grasp C: None  Grasp D: None  Grasp E: None  Hand Total: 0    Coordination/Speed  Tremor: Marked  Dysmetria: Marked  Time: >5s  Coordination/Speed Total : 0    Total A-D  Total A-D (Motor Function): 6/66     Percentage of impairment CH  0% CI  1-19% CJ  20-39% CK  40-59% CL  60-79% CM  80-99% CN  100%   Fugl-Alcazar score: 0-66 66 53-65 39-52 26-38 13-25 1-12   0      This is a reliable/valid measure of arm function after a neurological event. It has established value to characterize functional status and for measuring spontaneous and therapy-induced recovery; tests proximal and distal motor functions.  Fugl-Alcazar Assessment  UE scores recorded between five and 30 days post neurologic event can be used to predict UE recovery at six months post neurologic event. Severe = 0-21 points   Moderately Severe = 22-33 points   Moderate = 34-47 points   Mild = 48-66 points  GALEN Pichardo, NAT Soni, & ALANNAH Waldron (1992). Measurement of motor recovery after stroke: Outcome assessment and sample size requirements. Stroke, 23, pp. 7195-5654.   ------------------------------------------------------------------------------------------------------------------------------------------------------------------  MCID:  Stroke:   Brenton Riojas et al, 2001; n = 171; mean age 79 (5) years; assessed within 16 (12) days of stroke, Acute Stroke)  FMA Motor Scores from Admission to Discharge   10 point increase in FMA Upper Extremity = 1.5 change in discharge FIM   10 point increase in FMA Lower Extremity = 1.9 change in discharge FIM  MDC:   Stroke:   Luna Savage et al, 2008, n = 14, mean age = 59.9 (14.6) years, assessed on average 14 (6.5) months post stroke, Chronic Stroke)   FMA = 5.2 points for the Upper Extremity portion of the assessment     G codes: In compliance with CMSs Claims Based Outcome Reporting, the following G-code set was chosen for this patient based on their primary functional limitation being treated: The outcome measure chosen to determine the severity of the functional limitation was the [facility] with a score of 6/66 which was correlated with the impairment scale. ?  Self Care:     - CURRENT STATUS: CM - 80%-99% impaired, limited or restricted    - GOAL STATUS: CK - 40%-59% impaired, limited or restricted    - D/C STATUS:  ---------------To be determined---------------          Pain:Pain Scale 1: Numeric (0 - 10)  Pain Intensity 1: 0              Activity Tolerance:   174/104, HR  during activity sitting  Please refer to the flowsheet for vital signs taken during this treatment. After treatment:   []  Patient left in no apparent distress sitting up in chair  [x]  Patient left in no apparent distress in bed  [x]  Call bell left within reach  [x]  Nursing notified  [x]  Caregiver present  []  Bed alarm activated    COMMUNICATION/EDUCATION:   The patients plan of care was discussed with: Physical Therapist and Registered Nurse. Patient was educated regarding His deficit(s) of left motor and visual neglect, motor planning, initiating tasks, coordination, attention to task left sided weakness as this relates to His diagnosis of Left parietal lesion. He demonstrated Poor - Terminal understanding as evidenced by no head nod. Wife indicated understanding of all information. Patient and/or family was verbally educated on the BE FAST acronym for signs/symptoms of CVA and TIA. BE FAST was written on patient's communication board  for visual education and reinforcement. All questions answered with patient indicating no understanding; wife indicated understsanding. [x]      Home safety education was provided and the patient/caregiver indicated understanding. []      Patient/family have participated as able and agree with findings and recommendations. []      Patient is unable to participate in plan of care at this time. This patients plan of care is appropriate for delegation to [facility] [place].     Thank you for this referral.  Mich Shipley  Time Calculation: 35 mins

## 2017-07-18 NOTE — PROGRESS NOTES
Neurosurgery    Pt discussed with Dr. Ashley Ortega. Will add Brainlab protocol to MRI. Dr. Ashley Ortega will be by to see the patient later today.     Isai Giron NP

## 2017-07-18 NOTE — PROGRESS NOTES
Problem: Mobility Impaired (Adult and Pediatric)  Goal: *Acute Goals and Plan of Care (Insert Text)  Physical Therapy Goals  Initiated 7/18/2017  1. Patient will move from supine to sit and sit to supine in bed with minimal assistance/contact guard assist within 5 day(s). 2. Patient will transfer from bed to chair and chair to bed with moderate assistance x1 using the least restrictive device within 5 day(s). 3. Patient will perform sit to stand with minimal assistance/contact guard assist within 5 day(s). 4. Patient will ambulate with moderate assistance for 25 feet with the least restrictive device within 5 day(s). PHYSICAL THERAPY EVALUATION  Patient: Colton Malcolm (38 y.o. male)  Date: 7/18/2017  Primary Diagnosis: SVT/Hypoxia/PNA  Hypoxia  Precautions:   Fall (AMS, L side inattention)      ASSESSMENT :  Based on the objective data described below, the patient presents with globally decreased strength, sensation and attention to/on left side of body. Pt also with limited balance , command following, motor planning, and is [place] increasing the challenge of assessment and performance. Pt seen initially with PT/tech though OT included for increased skill demand and needs. Pt presenting with L neglect and dense impairment with bed assessment. Upon seated EOB, pt with LLE/LUE movement and participation; pt even able to initiate transfers and brief gait training this date. Pt in chair in NAD when left with OT for further assessment. Pt appropriate for discharge to inpt rehab from PT standpoint 2* living with wife, requiring mod A x2-maxA and demonstrating ability to tolerate 3 hrs of therapy per day. Patient will benefit from skilled intervention to address the above impairments.   Patients rehabilitation potential is considered to be good-fair  Factors which may influence rehabilitation potential include:   [ ]         None noted  [X]         Mental ability/status  [ ]         Medical condition  [ ] Home/family situation and support systems  [ ]         Safety awareness  [ ]         Pain tolerance/management  [ ]         Other:        PLAN :  Recommendations and Planned Interventions:  [X]           Bed Mobility Training             [ ]    Neuromuscular Re-Education  [X]           Transfer Training                   [ ]    Orthotic/Prosthetic Training  [X]           Gait Training                         [ ]    Modalities  [X]           Therapeutic Exercises           [ ]    Edema Management/Control  [X]           Therapeutic Activities            [X]    Patient and Family Training/Education  [ ]           Other (comment):     Frequency/Duration: Patient will be followed by physical therapy  5 times a week to address goals. Discharge Recommendations: Inpatient Rehab  Further Equipment Recommendations for Discharge: NA       SUBJECTIVE:   Patient stated OK, let's do it!       OBJECTIVE DATA SUMMARY:   HISTORY:    No past medical history on file. No past surgical history on file.   Prior Level of Function/Home Situation: independent with SPC and some LB dressing assistance from wife  Personal factors and/or comorbidities impacting plan of care: supportive wife, poor medical prognosis (long term)     Home Situation  Home Environment: Private residence  # Steps to Enter: 3  Rails to Enter: Yes  Hand Rails : Bilateral  Wheelchair Ramp: No  One/Two Story Residence: One story  Living Alone: No  Support Systems: Spouse/Significant Other/Partner, Family member(s)  Patient Expects to be Discharged to[de-identified] Private residence  Current DME Used/Available at Home: [employer] [place], straight, Shower chair, Walker, rolling     EXAMINATION/PRESENTATION/DECISION MAKING:   Critical Behavior:  Neurologic State: Confused, Eyes open to stimulus  Orientation Level:  (NT)  Cognition: Decreased attention/concentration, Decreased command following, Impaired decision making, Unable to assess (comment) (limited by [facility])  Safety/Judgement: Decreased awareness of environment, Decreased awareness of need for safety, Decreased insight into deficits, Fall prevention, Home safety  Hearing: Auditory  Auditory Impairment: Hard of hearing, bilateral  Range Of Motion:  AROM: Generally decreased, functional           PROM: Within functional limits           Strength:    Strength: Generally decreased, functional (Unable to MMT 2* [place]/comprehension)                    Tone & Sensation:   Tone: Normal              Sensation: Impaired (pt with pain w/d; difficult to test LT 2* hearing/comprehension)               Coordination:  Coordination: Generally decreased, functional  Vision:    pt with poor visual field on left/midline  Functional Mobility:  Bed Mobility:     Supine to Sit: Moderate assistance; Additional time;Assist x2 (increased assistance likely 2* [place]/comp)  Sit to Supine:  (NT; OOB to chair)  Scooting: Minimum assistance; Additional time (increased visual cues)  Transfers:  Sit to Stand: Moderate assistance; Additional time;Assist x2 (increased visual and tactile cues)  Stand to Sit: Moderate assistance; Additional time;Assist x2                       Balance:   Sitting: Impaired; Without support  Sitting - Static: Good (unsupported)  Sitting - Dynamic: Fair (occasional)  Standing: Impaired; With support  Standing - Static: Fair  Standing - Dynamic : Poor  Ambulation/Gait Training:  Distance (ft): 5 Feet (ft) (x2 (fwd and back at EOB for safety))  Assistive Device: Gait belt (B HHA)  Ambulation - Level of Assistance: Moderate assistance; Additional time;Assist x2     Gait Description (WDL): Exceptions to WDL  Gait Abnormalities: Decreased step clearance; Step to gait (Trunk sway assist provided fpr LE advancement)     Left Side Weight Bearing: As tolerated  Base of Support: Widened  Stance: Left decreased  Speed/Lanette: Slow;Pace decreased (<100 feet/min); Delayed  Step Length: Right shortened;Left shortened  Therapeutic Exercises:   APs encouraged     Functional Measure:  Tabor Balance Test:      Sitting to Standin  Standing Unsupported: 0  Sitting with Back Unsupported: 3  Standing to Sittin  Transfers: 0  Standing Unsupported with Eyes Closed: 0  Standing Unsupported with Feet Together: 0  Reach Forward with Outstretched Arm: 0   Object: 0  Turn to Look Over Shoulders: 0  Turn 360 Degrees: 0  Alternate Foot on Step/Stool: 0  Standing Unsupported One Foot in Front: 0  Stand on One Le  Total: 3             56=Maximum possible score;   0-20=High fall risk  21-40=Moderate fall risk   41-56=Low fall risk      Tabor Balance Test and G-code impairment scale:  Percentage of Impairment CH     0%    CI     1-19% CJ     20-39% CK     40-59% CL     60-79% CM     80-99% CN      100%   Tabor   Score 0-56 56 45-55 34-44 23-33 12-22 1-11 0               G codes: In compliance with CMSs Claims Based Outcome Reporting, the following G-code set was chosen for this patient based on their primary functional limitation being treated: The outcome measure chosen to determine the severity of the functional limitation was the BBS with a score of 3/56 which was correlated with the impairment scale. · Mobility - Walking and Moving Around:               - CURRENT STATUS:    CM - 80%-99% impaired, limited or restricted               - GOAL STATUS:           CL - 60%-79% impaired, limited or restricted               - D/C STATUS:                       ---------------To be determined---------------      Pain:  Pain Scale 1: Numeric (0 - 10)  Pain Intensity 1: 0     Activity Tolerance:   NAD  Please refer to the flowsheet for vital signs taken during this treatment.   After treatment:   [X]         Patient left in no apparent distress sitting up in chair  [ ]         Patient left in no apparent distress in bed  [X]         Call bell left within reach  [X]         Nursing notified  [X]         OT present  [ ]         Bed alarm activated COMMUNICATION/EDUCATION:   The patients plan of care was discussed with: Registered Nurse, OT.  [X]         Fall prevention education was provided and the patient/caregiver indicated understanding. [X]         Patient/family have participated as able in goal setting and plan of care. [X]         Patient/family agree to work toward stated goals and plan of care. [ ]         Patient understands intent and goals of therapy, but is neutral about his/her participation. [ ]         Patient is unable to participate in goal setting and plan of care.      Thank you for this referral.  Buddy Louis   Time Calculation: 32 mins

## 2017-07-19 LAB
ALBUMIN SERPL BCP-MCNC: 2.2 G/DL (ref 3.5–5)
ALBUMIN/GLOB SERPL: 0.6 {RATIO} (ref 1.1–2.2)
ALP SERPL-CCNC: 117 U/L (ref 45–117)
ALT SERPL-CCNC: 71 U/L (ref 12–78)
ANION GAP BLD CALC-SCNC: 9 MMOL/L (ref 5–15)
AST SERPL W P-5'-P-CCNC: 96 U/L (ref 15–37)
BASOPHILS # BLD AUTO: 0 K/UL (ref 0–0.1)
BASOPHILS # BLD: 0 % (ref 0–1)
BILIRUB SERPL-MCNC: 0.3 MG/DL (ref 0.2–1)
BUN SERPL-MCNC: 15 MG/DL (ref 6–20)
BUN/CREAT SERPL: 15 (ref 12–20)
CALCIUM SERPL-MCNC: 8.7 MG/DL (ref 8.5–10.1)
CHLORIDE SERPL-SCNC: 101 MMOL/L (ref 97–108)
CO2 SERPL-SCNC: 27 MMOL/L (ref 21–32)
CREAT SERPL-MCNC: 1.01 MG/DL (ref 0.7–1.3)
DIFFERENTIAL METHOD BLD: ABNORMAL
EOSINOPHIL # BLD: 0 K/UL (ref 0–0.4)
EOSINOPHIL NFR BLD: 0 % (ref 0–7)
ERYTHROCYTE [DISTWIDTH] IN BLOOD BY AUTOMATED COUNT: 14.1 % (ref 11.5–14.5)
GLOBULIN SER CALC-MCNC: 3.9 G/DL (ref 2–4)
GLUCOSE BLD STRIP.AUTO-MCNC: 250 MG/DL (ref 65–100)
GLUCOSE BLD STRIP.AUTO-MCNC: 260 MG/DL (ref 65–100)
GLUCOSE BLD STRIP.AUTO-MCNC: 261 MG/DL (ref 65–100)
GLUCOSE BLD STRIP.AUTO-MCNC: 274 MG/DL (ref 65–100)
GLUCOSE BLD STRIP.AUTO-MCNC: 312 MG/DL (ref 65–100)
GLUCOSE BLD STRIP.AUTO-MCNC: 355 MG/DL (ref 65–100)
GLUCOSE SERPL-MCNC: 265 MG/DL (ref 65–100)
HCT VFR BLD AUTO: 25.6 % (ref 36.6–50.3)
HGB BLD-MCNC: 8.6 G/DL (ref 12.1–17)
LYMPHOCYTES # BLD AUTO: 5 % (ref 12–49)
LYMPHOCYTES # BLD: 0.6 K/UL (ref 0.8–3.5)
MAGNESIUM SERPL-MCNC: 1.7 MG/DL (ref 1.6–2.4)
MCH RBC QN AUTO: 30.3 PG (ref 26–34)
MCHC RBC AUTO-ENTMCNC: 33.6 G/DL (ref 30–36.5)
MCV RBC AUTO: 90.1 FL (ref 80–99)
MONOCYTES # BLD: 0.1 K/UL (ref 0–1)
MONOCYTES NFR BLD AUTO: 1 % (ref 5–13)
MYELOCYTES NFR BLD MANUAL: 2 %
NEUTS BAND NFR BLD MANUAL: 1 % (ref 0–6)
NEUTS SEG # BLD: 10.4 K/UL (ref 1.8–8)
NEUTS SEG NFR BLD AUTO: 91 % (ref 32–75)
PLATELET # BLD AUTO: 200 K/UL (ref 150–400)
POTASSIUM SERPL-SCNC: 4.1 MMOL/L (ref 3.5–5.1)
PROT SERPL-MCNC: 6.1 G/DL (ref 6.4–8.2)
RBC # BLD AUTO: 2.84 M/UL (ref 4.1–5.7)
RBC MORPH BLD: ABNORMAL
SERVICE CMNT-IMP: ABNORMAL
SODIUM SERPL-SCNC: 137 MMOL/L (ref 136–145)
WBC # BLD AUTO: 11.3 K/UL (ref 4.1–11.1)

## 2017-07-19 PROCEDURE — 74011636637 HC RX REV CODE- 636/637: Performed by: HOSPITALIST

## 2017-07-19 PROCEDURE — 97116 GAIT TRAINING THERAPY: CPT

## 2017-07-19 PROCEDURE — 74011250637 HC RX REV CODE- 250/637: Performed by: NURSE PRACTITIONER

## 2017-07-19 PROCEDURE — 65660000000 HC RM CCU STEPDOWN

## 2017-07-19 PROCEDURE — 74011250637 HC RX REV CODE- 250/637: Performed by: HOSPITALIST

## 2017-07-19 PROCEDURE — 80053 COMPREHEN METABOLIC PANEL: CPT | Performed by: HOSPITALIST

## 2017-07-19 PROCEDURE — 85025 COMPLETE CBC W/AUTO DIFF WBC: CPT | Performed by: HOSPITALIST

## 2017-07-19 PROCEDURE — 83735 ASSAY OF MAGNESIUM: CPT | Performed by: HOSPITALIST

## 2017-07-19 PROCEDURE — 74011250636 HC RX REV CODE- 250/636: Performed by: HOSPITALIST

## 2017-07-19 PROCEDURE — 82962 GLUCOSE BLOOD TEST: CPT

## 2017-07-19 PROCEDURE — 74011636637 HC RX REV CODE- 636/637: Performed by: NURSE PRACTITIONER

## 2017-07-19 PROCEDURE — 36415 COLL VENOUS BLD VENIPUNCTURE: CPT | Performed by: HOSPITALIST

## 2017-07-19 PROCEDURE — 74011000258 HC RX REV CODE- 258: Performed by: HOSPITALIST

## 2017-07-19 PROCEDURE — 97535 SELF CARE MNGMENT TRAINING: CPT

## 2017-07-19 RX ORDER — INSULIN GLARGINE 100 [IU]/ML
20 INJECTION, SOLUTION SUBCUTANEOUS
Status: DISCONTINUED | OUTPATIENT
Start: 2017-07-19 | End: 2017-07-21 | Stop reason: HOSPADM

## 2017-07-19 RX ORDER — INSULIN LISPRO 100 [IU]/ML
6 INJECTION, SOLUTION INTRAVENOUS; SUBCUTANEOUS ONCE
Status: COMPLETED | OUTPATIENT
Start: 2017-07-19 | End: 2017-07-19

## 2017-07-19 RX ADMIN — CARVEDILOL 25 MG: 12.5 TABLET, FILM COATED ORAL at 17:10

## 2017-07-19 RX ADMIN — DEXAMETHASONE SODIUM PHOSPHATE 4 MG: 4 INJECTION INTRA-ARTICULAR; INTRALESIONAL; INTRAMUSCULAR; INTRAVENOUS; SOFT TISSUE at 06:39

## 2017-07-19 RX ADMIN — DEXAMETHASONE SODIUM PHOSPHATE 4 MG: 4 INJECTION INTRA-ARTICULAR; INTRALESIONAL; INTRAMUSCULAR; INTRAVENOUS; SOFT TISSUE at 16:38

## 2017-07-19 RX ADMIN — PIPERACILLIN SODIUM,TAZOBACTAM SODIUM 3.38 G: 3; .375 INJECTION, POWDER, FOR SOLUTION INTRAVENOUS at 22:29

## 2017-07-19 RX ADMIN — PIPERACILLIN SODIUM,TAZOBACTAM SODIUM 3.38 G: 3; .375 INJECTION, POWDER, FOR SOLUTION INTRAVENOUS at 16:39

## 2017-07-19 RX ADMIN — DEXAMETHASONE SODIUM PHOSPHATE 4 MG: 4 INJECTION INTRA-ARTICULAR; INTRALESIONAL; INTRAMUSCULAR; INTRAVENOUS; SOFT TISSUE at 22:31

## 2017-07-19 RX ADMIN — DOCUSATE SODIUM 100 MG: 100 CAPSULE, LIQUID FILLED ORAL at 08:18

## 2017-07-19 RX ADMIN — LOVASTATIN 10 MG: 20 TABLET ORAL at 08:17

## 2017-07-19 RX ADMIN — Medication 10 ML: at 06:38

## 2017-07-19 RX ADMIN — LEVETIRACETAM 500 MG: 500 TABLET ORAL at 08:19

## 2017-07-19 RX ADMIN — DILTIAZEM HYDROCHLORIDE 120 MG: 120 CAPSULE, EXTENDED RELEASE ORAL at 08:19

## 2017-07-19 RX ADMIN — DOCUSATE SODIUM 100 MG: 100 CAPSULE, LIQUID FILLED ORAL at 17:10

## 2017-07-19 RX ADMIN — PIPERACILLIN SODIUM,TAZOBACTAM SODIUM 3.38 G: 3; .375 INJECTION, POWDER, FOR SOLUTION INTRAVENOUS at 06:39

## 2017-07-19 RX ADMIN — INSULIN LISPRO 5 UNITS: 100 INJECTION, SOLUTION INTRAVENOUS; SUBCUTANEOUS at 17:09

## 2017-07-19 RX ADMIN — INSULIN LISPRO 6 UNITS: 100 INJECTION, SOLUTION INTRAVENOUS; SUBCUTANEOUS at 22:30

## 2017-07-19 RX ADMIN — LEVETIRACETAM 500 MG: 500 TABLET ORAL at 17:10

## 2017-07-19 RX ADMIN — Medication 10 ML: at 22:34

## 2017-07-19 RX ADMIN — INSULIN GLARGINE 20 UNITS: 100 INJECTION, SOLUTION SUBCUTANEOUS at 22:31

## 2017-07-19 RX ADMIN — VANCOMYCIN HYDROCHLORIDE 1500 MG: 10 INJECTION, POWDER, LYOPHILIZED, FOR SOLUTION INTRAVENOUS at 20:28

## 2017-07-19 RX ADMIN — INSULIN LISPRO 5 UNITS: 100 INJECTION, SOLUTION INTRAVENOUS; SUBCUTANEOUS at 12:31

## 2017-07-19 RX ADMIN — Medication 10 ML: at 06:39

## 2017-07-19 RX ADMIN — CARVEDILOL 25 MG: 12.5 TABLET, FILM COATED ORAL at 08:18

## 2017-07-19 RX ADMIN — VANCOMYCIN HYDROCHLORIDE 1500 MG: 10 INJECTION, POWDER, LYOPHILIZED, FOR SOLUTION INTRAVENOUS at 08:17

## 2017-07-19 RX ADMIN — LISINOPRIL 20 MG: 20 TABLET ORAL at 08:19

## 2017-07-19 RX ADMIN — FERROUS SULFATE TAB 325 MG (65 MG ELEMENTAL FE) 325 MG: 325 (65 FE) TAB at 08:18

## 2017-07-19 RX ADMIN — HYDROCHLOROTHIAZIDE 12.5 MG: 25 TABLET ORAL at 08:17

## 2017-07-19 RX ADMIN — Medication 10 ML: at 16:38

## 2017-07-19 RX ADMIN — INSULIN LISPRO 5 UNITS: 100 INJECTION, SOLUTION INTRAVENOUS; SUBCUTANEOUS at 07:10

## 2017-07-19 NOTE — PROGRESS NOTES
Problem: Falls - Risk of  Goal: *Absence of falls  Outcome: Progressing Towards Goal  Left leg weak when attempting to stand with P.T. Some altered mental status present and Pt attempting to get OOB at times but easily reoriented.     Problem: Nutrition Deficit  Goal: *Optimize nutritional status  Outcome: Progressing Towards Goal  Supplements ordered to encourage maximum nutrition

## 2017-07-19 NOTE — DIABETES MGMT
DTC Progress Note    Recommendations/ Comments: Pt's chart reviewed due to hyperglycemia likely due to steroids. Pt is on Dexamethasone 4mg/ml every 8 hours. Pt has required 35 units of correction insulin in the last 24 hours. Noted Lantus 10 units daily started. If appropriate, please consider:  1. Increasing Lantus to 30 units daily while pt on dexamethasone q8hrs  2. Changing correction scale to resistant scale. 3. Adding A1c to patient's next blood draw. Chart reviewed on [facility]. Patient is a 80 y.o. male with known diabetes on no diabetes medications at home. A1c:   No results found for: HBA1C, HGBE8, OSC0JVMO, KMC1TXIC    Recent Glucose Results:   Lab Results   Component Value Date/Time     (H) 07/19/2017 03:45 AM    GLUCPOC 260 (H) 07/19/2017 11:27 AM    GLUCPOC 312 (H) 07/19/2017 11:26 AM    GLUCPOC 250 (H) 07/19/2017 08:11 AM        Lab Results   Component Value Date/Time    Creatinine 1.01 07/19/2017 03:45 AM     Estimated Creatinine Clearance: 90.8 mL/min (based on Cr of 1.01). Active Orders   Diet    DIET DIABETIC WITH OPTIONS Consistent Carb 2400kcal; Regular; 2 GM NA (House Low NA)        PO intake:   Patient Vitals for the past 72 hrs:   % Diet Eaten   07/18/17 1752 80 %   07/18/17 1223 50 %   07/18/17 0825 75 %       Current hospital DM medication: correction scale Humalog, normal sensitivity and Lantus 10 units daily     Will continue to follow as needed.     Thank you  Della Brian RD

## 2017-07-19 NOTE — PROGRESS NOTES
NUTRITION COMPLETE ASSESSMENT    RECOMMENDATIONS:   1. Encourage PO intake at meals and with supplements - document in I/O flowsheet  2. Check A1c - none on record  3. Daily weights     Interventions/Plan:   Food/Nutrient Delivery:   (HS snack) Commercial supplement (Glucerna BID, Boost Pudding)          Assessment:   Reason for Assessment: [x]BPA/MST Referral (24-33# wt loss, poor appetite)    Diet: Consistent carb  Supplements: none  Nutritionally Significant Medications: [x] Reviewed & Includes: decadron, cardizem, colace, iron, HCTZ, lantus (10 units), SSI, keppra, zosyn, vanco, NS @ 75ml/hr; PRN: zofran  Meal Intake:   Patient Vitals for the past 100 hrs:   % Diet Eaten   07/18/17 1752 80 %   07/18/17 1223 50 %   07/18/17 0825 75 %     Pre-Hospitalization:  Usual Appetite: Poor  Diet at Home: regular  Vitamins/Supplements: No    Current Hospitalization:   Appetite: Fair  PO Ability: Independent Average po intake:%  Average supplements intake:        Subjective:  \"I'll eat 3 meals if I get 3 meals. \"    Objective:  Pt admitted for hypoxia from [facility]. PMHx: colon CA w/ mets to kidney, liver, and brain (s/p radiation/chemo/surgery), DM, HTN, hypercholesterolemia. IV abx in place for possible sepsis. AMS on admit with brain mets noted. Decadron rx - BG elevated with DTC following. No A1c, please check. Seen by neurosurgery, full note pending, do not may need possible surgery if consistent with pt care plan. 6# wt loss over past 6 months reported; 10% x 10 months. Not significant but increase in BLE and generalized edema may be masking further wt loss. Poor appetite noted. Wt Readings from Last 10 Encounters:   07/19/17 115.9 kg (255 lb 8.2 oz)   07/18/17 114.3 kg (252 lb)   09/28/16 128.2 kg (282 lb 9 oz)     Pt and wife visited. Pt very [place]. Wife reports intake down significant over past couple of weeks especially. Usually eats very well and not a picky eater.  Has liked Glucerna in the past (chocolate) and agreeable to along with Boost Pudding as HS snack (680kcal, 26g protein). Ate only a few bites of eggs this morning. Likes: grits w/ s/p, chicken, hot dogs, chocolate pudding. Will continue to follow for PO intake, wt trends, supplements acceptance and BG. Estimated Nutrition Needs:   Kcals/day: 2540 Kcals/day (2540-2750kcal)  Protein: 116 g (116-128g(1-1.1g/kg))  Fluid: 2540 ml (1ml/kcal)  Based On: [place] (x 1.2-1.3)  Weight Used: Actual wt (115.9kg)    Pt expected to meet estimated nutrient needs:  []   Yes     []  No [x] Unable to predict at this time  Nutrition Diagnosis:   1. Unintended weight loss related to inadequate oral intake as evidenced by poor appetite PTA, wt loss reported over 6 months'    2.  (Increased protein/energy needs ) related to increased energy expenditure 2/2 dx progression as evidenced by colon CA w/ mets; wt loss noted    Goals:     Continued consumption of at least 75% of meals and 1-2 supplements/day in 5-7 days     Monitoring & Evaluation:    - Liquid meal replacement, Total energy intake   - Weight/weight change    Previous Nutrition Goals Met:   N/A  Previous Recommendations:    N/A    Education & Discharge Needs:   [x] None Identified   [] Identified and addressed    [] Participated in care plan, discharge planning, and/or interdisciplinary rounds        Cultural, Quaker and ethnic food preferences identified: None    Skin Integrity: [x]Intact  []Other  Edema: []None [x]Other: 1+ generalized  Last BM: 7/17  Food Allergies: [x]None []Other  Diet Restrictions: Cultural/Jewish Preference(s): None     Anthropometrics:    Weight Loss Metrics 7/19/2017 9/28/2016   Today's Wt 255 lb 8.2 oz 282 lb 9 oz   BMI 33.71 kg/m2 37.28 kg/m2      Weight Source: Bed  Height: 6' 1\" (185.4 cm),    Body mass index is 33.71 kg/(m^2).   IBW : 83.5 kg (184 lb), % IBW (Calculated): 137.43 %  Usual Body Weight: 127.9 kg (282 lb) (9/2016),      Labs:    Lab Results Component Value Date/Time    Sodium 137 07/19/2017 03:45 AM    Potassium 4.1 07/19/2017 03:45 AM    Chloride 101 07/19/2017 03:45 AM    CO2 27 07/19/2017 03:45 AM    Glucose 265 07/19/2017 03:45 AM    BUN 15 07/19/2017 03:45 AM    Creatinine 1.01 07/19/2017 03:45 AM    Calcium 8.7 07/19/2017 03:45 AM    Magnesium 1.7 07/19/2017 03:45 AM    Albumin 2.2 07/19/2017 03:45 AM     No results found for: HBA1C, HGBE8, SET1URYZ, JHK6WJLB, FYV2ZCHS    Yosi Warner, RD

## 2017-07-19 NOTE — PROGRESS NOTES
ID Progress Note  2017    Subjective:     Afebrile. No complaints. Objective:     Vitals:   Visit Vitals    /69 (BP 1 Location: Right arm, BP Patient Position: At rest)    Pulse 64    Temp 99 °F (37.2 °C)    Resp 18    Ht 6' 1\" (1.854 m)    Wt 115.9 kg (255 lb 8.2 oz)    SpO2 100%    BMI 33.71 kg/m2        Tmax:  Temp (24hrs), Av.7 °F (37.1 °C), Min:98 °F (36.7 °C), Max:99.2 °F (37.3 °C)      Exam:    Not in distress  Lungs: clear to auscultation  Heart: s1, s2, no murmurs   Abdomen: soft, nontender      Labs:   Lab Results   Component Value Date/Time    WBC 11.3 2017 03:45 AM    HGB 8.6 2017 03:45 AM    HCT 25.6 2017 03:45 AM    PLATELET 538  03:45 AM    MCV 90.1 2017 03:45 AM     Lab Results   Component Value Date/Time    Sodium 137 2017 03:45 AM    Potassium 4.1 2017 03:45 AM    Chloride 101 2017 03:45 AM    CO2 27 2017 03:45 AM    Anion gap 9 2017 03:45 AM    Glucose 265 2017 03:45 AM    BUN 15 2017 03:45 AM    Creatinine 1.01 2017 03:45 AM    BUN/Creatinine ratio 15 2017 03:45 AM    GFR est AA >60 2017 03:45 AM    GFR est non-AA >60 2017 03:45 AM    Calcium 8.7 2017 03:45 AM    Bilirubin, total 0.3 2017 03:45 AM    AST (SGOT) 96 2017 03:45 AM    Alk. phosphatase 117 2017 03:45 AM    Protein, total 6.1 2017 03:45 AM    Albumin 2.2 2017 03:45 AM    Globulin 3.9 2017 03:45 AM    A-G Ratio 0.6 2017 03:45 AM    ALT (SGPT) 71 2017 03:45 AM           Assessment:     1. Fever. 2. Metastatic colon cancer to the kidneys, liver and brain. 3. Left hemineglect secondary to right parietal lesion. 4. Diabetes. 5. Left leg deep venous thrombosis. 6. Recent supraventricular tachycardia. 7. History of seizures.          Recommendations: There is no obvious source of the fever.  Urinalysis at [facility]   only showed 2-4 white blood cells, and x-ray there did not reveal any   pneumonia. We should await the blood culture results. We should   continue vancomycin and Zosyn for now.      Christi Raygoza MD

## 2017-07-19 NOTE — PROGRESS NOTES
Hospitalist Progress Note  Rosetta Mccormack MD  Office: [phone]        Date of Service:  2017  NAME:  Sena Bermudez  :  1945  MRN:  [id number]      Admission Summary:   The patient is a 52-year-old [facility] American male with past medical history significant for metastatic colon cancer to the kidney, liver and brain status post surgery, radiation and chemotherapy, diabetes mellitus type 2,  hypertension, seizures, left leg DVT on Coumadin and hypercholesterolemia, who is directly admitted from [facility] Emergency Department for hypoxia and fever. The patient gives little information, but his wife at the bedside gave most of the information. His wife stated that last evening he started to have left-sided weakness and could not walk      Interval history / Subjective:    Alert and awake. Family at bedside. His wife wanted  Transfer to [facility] in AM.     Assessment & Plan:   Left sided kuldeep neglect and hemiparesis with metabolic encephalopathy due to metastatic cancer. -IV steroid. Neurology evaluated. He could not do MRI yesterday  -Wife wouldn't no consider surgery at the present time and wants to transfer to [facility] where his doctor are. I thin its reasonable and will try to coordinate tranfers tomorrow. Low grade fever and leukocytosis  -No evidence of apparent infection,no UA sent per ID UA from [facility] is negative  -Cancer patient and at risk for infection,continue empiric antibiotics for now until cultures are negative for 28 to 72 hours. Diabetes mellitus type 2.    -hyperglycemia due to steroids.  -Added Lantus    Mild Hypoxia on 2 LPM,wean as able. Metastatic colon cancer to the liver, brain and kidney. Hypertension, uncontrolled. Hypercholesterolemia. Supraventricular tachycardia. On Cardizem and coreg. Hypomagnesemia,replace. recheck. History of seizure disorder. On keppra. History of left leg deep venous thrombosis. SCD. Anticoagulation is unsafe  With active symptomatic brain metastasis. Code status:full  DVT prophylaxis: scd    Care Plan discussed with: Patient/Family and Nurse  Disposition:Transfer to [facility] tomorrow if accepted. Hospital Problems  Date Reviewed: 7/18/2017          Codes Class Noted POA    * (Principal)Hypoxia ICD-10-CM: R09.02  ICD-9-CM: 799.02  7/18/2017 Unknown        Colon cancer metastasized to brain [facility]) ICD-10-CM: C18.9, C79.31  ICD-9-CM: 153.9, 198.3  7/18/2017 Unknown        Fever ICD-10-CM: R50.9  ICD-9-CM: 780.60  7/18/2017 Unknown                Review of Systems:   Review of systems not obtained due to patient factors. Vital Signs:    Last 24hrs VS reviewed since prior progress note. Most recent are:  Visit Vitals    /74 (BP 1 Location: Right arm)    Pulse 61    Temp 98 °F (36.7 °C)    Resp 18    Ht 6' 1\" (1.854 m)    Wt 115.9 kg (255 lb 8.2 oz)    SpO2 95%    BMI 33.71 kg/m2         Intake/Output Summary (Last 24 hours) at 07/19/17 1830  Last data filed at 07/19/17 1122   Gross per 24 hour   Intake             1350 ml   Output              320 ml   Net             1030 ml        Physical Examination:             Constitutional:  No acute distress, alert. Neglects left side. ENT:  Oral mucous moist, oropharynx benign. Neck supple,    Resp:  CTA bilaterally. No wheezing/rhonchi/rales. No accessory muscle use   CV:  Regular rhythm, normal rate, no murmurs, gallops, rubs    GI:  Soft, non distended, non tender. normoactive bowel sounds, no hepatosplenomegaly     Musculoskeletal:  No edema, warm, 2+ pulses throughout    Neurologic: Left side weakness,neglect. Alert and awake. 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      Hematologic/Lymphatic/Immunlogic:  No jaundice nor lymph node swelling       Data Review:    Review and/or order of clinical lab test  Review and/or order of tests in the radiology section of CPT  Review and/or order of tests in the medicine section of [facility]      Labs:     Recent Labs      07/19/17   0345 07/18/17   0645   WBC  11.3*  12.0*   HGB  8.6*  9.7*   HCT  25.6*  28.5*   PLT  200  221     Recent Labs      07/19/17   0345  07/18/17   0645   NA  137  135*   K  4.1  4.2   CL  101  98   CO2  27  29   BUN  15  9   CREA  1.01  0.83   GLU  265*  281*   CA  8.7  9.0   MG  1.7   --      Recent Labs      07/19/17   0345  07/18/17   0645   SGOT  96*  76*   ALT  71  79*   AP  117  140*   TBILI  0.3  0.4   TP  6.1*  6.6   ALB  2.2*  2.4*   GLOB  3.9  4.2*     Recent Labs      07/18/17   0645   INR  2.4*   PTP  24.9*      No results for input(s): FE, TIBC, PSAT, FERR in the last 72 hours. No results found for: FOL, RBCF   No results for input(s): PH, PCO2, PO2 in the last 72 hours. No results for input(s): CPK, CKNDX, TROIQ in the last 72 hours.     No lab exists for component: CPKMB  No results found for: CHOL, CHOLX, CHLST, CHOLV, HDL, LDL, LDLC, DLDLP, TGLX, TRIGL, TRIGP, CHHD, CHHDX  Lab Results   Component Value Date/Time    Glucose (POC) 261 07/19/2017 04:30 PM    Glucose (POC) 260 07/19/2017 11:27 AM    Glucose (POC) 312 07/19/2017 11:26 AM    Glucose (POC) 250 07/19/2017 08:11 AM    Glucose (POC) 274 07/19/2017 06:46 AM     No results found for: COLOR, APPRN, SPGRU, REFSG, ROCHELLE, PROTU, GLUCU, KETU, BILU, UROU, MELINA, LEUKU, GLUKE, EPSU, BACTU, WBCU, RBCU, CASTS, UCRY      Medications Reviewed:     Current Facility-Administered Medications   Medication Dose Route Frequency    [START ON 7/20/2017] vancomycin trough on 7/20 @ 07:30 - draw 30 minutes before dose due at 08:00   Other ONCE    lisinopril (PRINIVIL, ZESTRIL) tablet 20 mg  20 mg Oral DAILY    dilTIAZem CD (CARDIZEM CD) capsule 120 mg  120 mg Oral DAILY    ferrous sulfate tablet 325 mg  1 Tab Oral DAILY WITH BREAKFAST    lovastatin (MEVACOR) tablet 10 mg  10 mg Oral DAILY    levETIRAcetam (KEPPRA) tablet 500 mg  500 mg Oral BID    dexamethasone (DECADRON) 4 mg/mL injection 4 mg  4 mg IntraVENous Q8H    insulin lispro (HUMALOG) injection   SubCUTAneous AC&HS    glucose chewable tablet 16 g  4 Tab Oral PRN    glucagon (GLUCAGEN) injection 1 mg  1 mg IntraMUSCular PRN    0.9% sodium chloride infusion  75 mL/hr IntraVENous CONTINUOUS    acetaminophen (TYLENOL) tablet 650 mg  650 mg Oral Q4H PRN    HYDROcodone-acetaminophen (NORCO) 5-325 mg per tablet 1 Tab  1 Tab Oral Q4H PRN    ondansetron (ZOFRAN ODT) tablet 4 mg  4 mg Oral Q4H PRN    docusate sodium (COLACE) capsule 100 mg  100 mg Oral BID    albuterol-ipratropium (DUO-NEB) 2.5 MG-0.5 MG/3 ML  3 mL Nebulization Q4H PRN    dextrose 10 % infusion 125-250 mL  125-250 mL IntraVENous PRN    vancomycin dosing by pharmacy   Other Rx Dosing/Monitoring    carvedilol (COREG) tablet 25 mg  25 mg Oral BID WITH MEALS    hydroCHLOROthiazide (HYDRODIURIL) tablet 12.5 mg  12.5 mg Oral DAILY    piperacillin-tazobactam (ZOSYN) 3.375 g in 0.9% sodium chloride (MBP/ADV) 100 mL  3.375 g IntraVENous Q8H    vancomycin (VANCOCIN) 1500 mg in  ml infusion  1,500 mg IntraVENous Q12H    insulin glargine (LANTUS) injection 10 Units  10 Units SubCUTAneous QHS    SALINE PERIPHERAL FLUSH Q8H soln 5-10 mL  5-10 mL InterCATHeter Q8H    saline peripheral flush soln 5-10 mL  5-10 mL InterCATHeter PRN     ______________________________________________________________________  EXPECTED LENGTH OF STAY: 4d 21h  ACTUAL LENGTH OF STAY:          1                 Vanessa Altamirano MD

## 2017-07-19 NOTE — PROGRESS NOTES
Cardiology Progress Note            Admit Date: 7/18/2017  Admit Diagnosis: SVT/Hypoxia/PNA  Hypoxia  Date: 7/19/2017     Time: 9:24 AM       Assessment and Plan     . Hx of SVT- at [address] resolved s/p cardizem gtt. Now NSR on telemetry- HR 60's  -TTE: NL EF, NWMA, +LVH. -Stop cardizem gtt   -TTE   -Cardizem  mg daily  -Coreg 25 mg BID  -No further cardiac testing      2. Hx of HTN:  BP currently controlled   -Continue Cardizem CD as above  -Continue Lisinopril 20 mg daily  -Coreg 25 mg BID  -HCTZ 12.5 mg daily     3. Hx of HLD:  -Mevacor 10 mg daily     3. Hx of LLE DVT:    -Off anticoagulation due to active brain metastasis.     4. Hx of DM type II: uncontrolled. -Management per primary team.     5. Metastatic colon cancer: (mets to brain and liver). On decadron.     6. Leukocytosis/possible sepsis:  WBC trending down. Management per primary team       Pt with improved HR- now sinus rhythm, rate 60's. EF NL on TTE. Stop cardizem gtt, continue cardizem CD, coreg, lisinopril and HCTZ as above. Cardiology will sign off. Cardiology Attending:Patient seen and examined. I agree with NP assessment and plans. No more SVT. Viki Liriano MD 7/19/2017 11:57 AM       Cardiac testing:  Left ventricle: Systolic function was normal. Ejection fraction was  estimated in the range of 55 % to 60 %. No obvious wall motion  abnormalities identified in the views obtained. Wall thickness was  increased. Right ventricle: Wall thickness was mildly increased. Aortic valve: Leaflets exhibited sclerosis    Subjective: Petra Hoffman. denies chest pain, SOB, palpitations. States he feels \"fine\".     Objective:      Physical Exam:                Visit Vitals    /67 (BP 1 Location: Right arm, BP Patient Position: At rest)    Pulse (!) 59    Temp 98 °F (36.7 °C)    Resp 18    Ht 6' 1\" (1.854 m)    Wt 115.9 kg (255 lb 8.2 oz)    SpO2 98%    BMI 33.71 kg/m2          General Appearance:   Well developed, well nourished,alert and oriented x 3, and   individual in no acute distress. Ears/Nose/Mouth/Throat:    Hearing grossly normal.         Neck:  Supple. Chest:    Lungs clear to auscultation bilaterally, diminished bases. Cardiovascular:   Regular rate and rhythm, S1, S2 normal, no murmur. Abdomen:    Obese, Soft, non-tender, bowel sounds are active. Extremities:  Trace BLE edema bilaterally. Skin:  Warm and dry. Telemetry: normal sinus rhythm,          Data Review:    Labs:    Recent Results (from the past 24 hour(s))   GLUCOSE, POC    Collection Time: 07/18/17 11:45 AM   Result Value Ref Range    Glucose (POC) 317 (H) 65 - 100 mg/dL    Performed by Carroll HATCH    GLUCOSE, POC    Collection Time: 07/18/17 11:57 AM   Result Value Ref Range    Glucose (POC) 329 (H) 65 - 100 mg/dL    Performed by Pierce Mcfarlane    GLUCOSE, POC    Collection Time: 07/18/17  4:50 PM   Result Value Ref Range    Glucose (POC) 338 (H) 65 - 100 mg/dL    Performed by Ron Savage    GLUCOSE, POC    Collection Time: 07/18/17  9:21 PM   Result Value Ref Range    Glucose (POC) 326 (H) 65 - 100 mg/dL    Performed by Celina Meza    METABOLIC PANEL, COMPREHENSIVE    Collection Time: 07/19/17  3:45 AM   Result Value Ref Range    Sodium 137 136 - 145 mmol/L    Potassium 4.1 3.5 - 5.1 mmol/L    Chloride 101 97 - 108 mmol/L    CO2 27 21 - 32 mmol/L    Anion gap 9 5 - 15 mmol/L    Glucose 265 (H) 65 - 100 mg/dL    BUN 15 6 - 20 MG/DL    Creatinine 1.01 0.70 - 1.30 MG/DL    BUN/Creatinine ratio 15 12 - 20      GFR est AA >60 >60 ml/min/1.73m2    GFR est non-AA >60 >60 ml/min/1.73m2    Calcium 8.7 8.5 - 10.1 MG/DL    Bilirubin, total 0.3 0.2 - 1.0 MG/DL    ALT (SGPT) 71 12 - 78 U/L    AST (SGOT) 96 (H) 15 - 37 U/L    Alk.  phosphatase 117 45 - 117 U/L    Protein, total 6.1 (L) 6.4 - 8.2 g/dL    Albumin 2.2 (L) 3.5 - 5.0 g/dL    Globulin 3.9 2.0 - 4.0 g/dL    A-G Ratio 0.6 (L) 1.1 - 2.2     CBC WITH AUTOMATED DIFF    Collection Time: 07/19/17  3:45 AM   Result Value Ref Range    WBC 11.3 (H) 4.1 - 11.1 K/uL    RBC 2.84 (L) 4.10 - 5.70 M/uL    HGB 8.6 (L) 12.1 - 17.0 g/dL    HCT 25.6 (L) 36.6 - 50.3 %    MCV 90.1 80.0 - 99.0 FL    MCH 30.3 26.0 - 34.0 PG    MCHC 33.6 30.0 - 36.5 g/dL    RDW 14.1 11.5 - 14.5 %    PLATELET 456 085 - 734 K/uL    NEUTROPHILS 91 (H) 32 - 75 %    BAND NEUTROPHILS 1 0 - 6 %    LYMPHOCYTES 5 (L) 12 - 49 %    MONOCYTES 1 (L) 5 - 13 %    EOSINOPHILS 0 0 - 7 %    BASOPHILS 0 0 - 1 %    MYELOCYTES 2 (H) 0 %    ABS. NEUTROPHILS 10.4 (H) 1.8 - 8.0 K/UL    ABS. LYMPHOCYTES 0.6 (L) 0.8 - 3.5 K/UL    ABS. MONOCYTES 0.1 0.0 - 1.0 K/UL    ABS. EOSINOPHILS 0.0 0.0 - 0.4 K/UL    ABS.  BASOPHILS 0.0 0.0 - 0.1 K/UL    DF MANUAL      RBC COMMENTS POIKILOCYTOSIS  1+        RBC COMMENTS OVALOCYTES  1+        RBC COMMENTS ANISOCYTOSIS  1+        RBC COMMENTS MACROCYTOSIS  1+       MAGNESIUM    Collection Time: 07/19/17  3:45 AM   Result Value Ref Range    Magnesium 1.7 1.6 - 2.4 mg/dL   GLUCOSE, POC    Collection Time: 07/19/17  6:46 AM   Result Value Ref Range    Glucose (POC) 274 (H) 65 - 100 mg/dL    Performed by Sabrina Pedroza, POC    Collection Time: 07/19/17  8:11 AM   Result Value Ref Range    Glucose (POC) 250 (H) 65 - 100 mg/dL    Performed by Sue Christiansen           Radiology:        Current Facility-Administered Medications   Medication Dose Route Frequency    lisinopril (PRINIVIL, ZESTRIL) tablet 20 mg  20 mg Oral DAILY    dilTIAZem CD (CARDIZEM CD) capsule 120 mg  120 mg Oral DAILY    ferrous sulfate tablet 325 mg  1 Tab Oral DAILY WITH BREAKFAST    lovastatin (MEVACOR) tablet 10 mg  10 mg Oral DAILY    levETIRAcetam (KEPPRA) tablet 500 mg  500 mg Oral BID    dexamethasone (DECADRON) 4 mg/mL injection 4 mg  4 mg IntraVENous Q8H    insulin lispro (HUMALOG) injection   SubCUTAneous AC&HS    glucose chewable tablet 16 g 4 Tab Oral PRN    glucagon (GLUCAGEN) injection 1 mg  1 mg IntraMUSCular PRN    0.9% sodium chloride infusion  75 mL/hr IntraVENous CONTINUOUS    acetaminophen (TYLENOL) tablet 650 mg  650 mg Oral Q4H PRN    HYDROcodone-acetaminophen (NORCO) 5-325 mg per tablet 1 Tab  1 Tab Oral Q4H PRN    ondansetron (ZOFRAN ODT) tablet 4 mg  4 mg Oral Q4H PRN    docusate sodium (COLACE) capsule 100 mg  100 mg Oral BID    albuterol-ipratropium (DUO-NEB) 2.5 MG-0.5 MG/3 ML  3 mL Nebulization Q4H PRN    dextrose 10 % infusion 125-250 mL  125-250 mL IntraVENous PRN    vancomycin dosing by pharmacy   Other Rx Dosing/Monitoring    carvedilol (COREG) tablet 25 mg  25 mg Oral BID WITH MEALS    hydroCHLOROthiazide (HYDRODIURIL) tablet 12.5 mg  12.5 mg Oral DAILY    piperacillin-tazobactam (ZOSYN) 3.375 g in 0.9% sodium chloride (MBP/ADV) 100 mL  3.375 g IntraVENous Q8H    vancomycin (VANCOCIN) 1500 mg in  ml infusion  1,500 mg IntraVENous Q12H    insulin glargine (LANTUS) injection 10 Units  10 Units SubCUTAneous QHS    SALINE PERIPHERAL FLUSH Q8H soln 5-10 mL  5-10 mL InterCATHeter Q8H    saline peripheral flush soln 5-10 mL  5-10 mL InterCATHeter PRN          Erika Samuel NP     [facility] [address] [place] [address], [address] [place] [address] [address] [facility]   [place]   [phone]

## 2017-07-19 NOTE — PROGRESS NOTES
Problem: Mobility Impaired (Adult and Pediatric)  Goal: *Acute Goals and Plan of Care (Insert Text)  Physical Therapy Goals  Initiated 7/18/2017  1. Patient will move from supine to sit and sit to supine in bed with minimal assistance/contact guard assist within 5 day(s). 2. Patient will transfer from bed to chair and chair to bed with moderate assistance x1 using the least restrictive device within 5 day(s). 3. Patient will perform sit to stand with minimal assistance/contact guard assist within 5 day(s). 4. Patient will ambulate with moderate assistance for 25 feet with the least restrictive device within 5 day(s). PHYSICAL THERAPY TREATMENT  Patient: Petra Hoffman (70 y.o. male)  Date: 7/19/2017  Diagnosis: SVT/Hypoxia/PNA  Hypoxia Hypoxia       Precautions: Fall (AMS, L side inattention)      ASSESSMENT:  Pt is progressing well this date despite drowsiness and requiring max encouragement from wife and therapist/tech. Pt agreeable to complete bed mobility, transfers, and brief steps from bed to chair. RW used with some spontaneous LUE placement on walker though hand-over-hand required to maintain. Pt more readily moving LLE during gait with less weight shift assist today. Pt OOB to chair with wife present following dynamic standing tasks. Recommend back to bed with lift 2* pt lethargy and pt/staff safety and RN aware and agreeable. Pt remains appropriate for d/c to inpt rehab once medically stable for optimal physical acute recovery. Progression toward goals:  [ ]    Improving appropriately and progressing toward goals  [X]    Improving slowly and progressing toward goals  [ ]    Not making progress toward goals and plan of care will be adjusted       PLAN:  Patient continues to benefit from skilled intervention to address the above impairments. Continue treatment per established plan of care.   Discharge Recommendations:  Inpatient Rehab and To Be Determined  Further Equipment Recommendations for Discharge:  NA       SUBJECTIVE:   Patient stated Dex Alvarez, lets do it. ..  Despite verbalizing readiness, pt remained seated on EOB and with eyes closing intermittently. OBJECTIVE DATA SUMMARY:   Critical Behavior:  Neurologic State: Alert, Confused  Orientation Level: Disoriented to place, Disoriented to situation, Disoriented to time  Cognition: Decreased attention/concentration  Safety/Judgement: Decreased awareness of environment, Decreased awareness of need for safety, Decreased insight into deficits, Fall prevention, Home safety  Functional Mobility Training:  Bed Mobility:     Supine to Sit: Moderate assistance; Additional time  Sit to Supine:  (NT; OOB to chair)  Scooting: Moderate assistance; Additional time (max encouragement)        Transfers:  Sit to Stand: Moderate assistance; Additional time;Assist x2 (EOB elevated and cues to push off with UEs)  Stand to Sit: Moderate assistance; Additional time;Assist x2 (safety assist for alignment and reach back)                             Balance:  Sitting: Impaired; With support  Sitting - Static: Good (unsupported)  Sitting - Dynamic: Fair (occasional)  Standing: Impaired; With support  Standing - Static: Fair  Standing - Dynamic : Fair (-poor)  Ambulation/Gait Training:  Distance (ft): 5 Feet (ft) (EOB to chair on right)  Assistive Device: Gait belt;Walker, rolling (pt may benefit from cane/hemiwalker next session)  Ambulation - Level of Assistance: Moderate assistance; Additional time;Assist x2 (gait belt assist and hand-over-hand on RW on LUE)        Gait Abnormalities: Decreased step clearance;Shuffling gait;Trunk sway increased (pt moving LLE more readily this date)     Left Side Weight Bearing: As tolerated  Base of Support: Widened;Center of gravity altered  Stance: Left decreased  Speed/Lanette: Pace decreased (<100 feet/min); Fluctuations  Step Length: Right shortened;Left shortened     Pain:  Pain Scale 1: Numeric (0 - 10)  Pain Intensity 1: 0 Activity Tolerance:   VSS; /78 post transfer 98% SpO2      After treatment:   [X]    Patient left in no apparent distress sitting up in chair  [ ]    Patient left in no apparent distress in bed  [X]    Call bell left within reach  [X]    Nursing notified  [X]    Caregiver present  [ ]    Bed alarm activated      COMMUNICATION/COLLABORATION:   The patients plan of care was discussed with: Registered Nurse     Jolanta Jay   Time Calculation: 30 mins

## 2017-07-19 NOTE — PROGRESS NOTES
I came in to review the MRI which unfortunately could not be done. Pt's wife states that the patient was moving too much in the MRI so it was stopped. He apparently has undergone MRIs at [facility] in the past  She expected that since all his doctors are at [facility], he would be taken to [facility] when he was transferred from [facility]. They were told that there were no beds available there when transferred. Wife would prefer still that he be transferred to [facility] but feels she has not been able to communicate that to the primary care team. She also stated that she does not wish him to undergo any surgery regarding the brain tumor. She was grateful for my follow-up but in light of the fact that no surgery is desired, I would not attempt to repeat the MRI at this time  I will sign off case. I would be happy to re eval on request  The patient is awake, conversant,  but seems to have very little insight into his disease.  Will defer care to the primary care team  Thank you  [facility]

## 2017-07-19 NOTE — PROGRESS NOTES
Bedside and Written shift change report given to Jose E Arita (oncoming nurse) by Bridget Valdes (offgoing nurse).  Report included the following information Kardex, Intake/Output, MAR, Recent Results, Med Rec Status and Cardiac Rhythm SR.

## 2017-07-19 NOTE — PROGRESS NOTES
Problem: Self Care Deficits Care Plan (Adult)  Goal: *Acute Goals and Plan of Care (Insert Text)  Occupational Therapy Goals  Initiated 7/18/2017   1. Patient will perform grooming with moderate assistance within 7 day(s). 2. Patient will perform visually attending to ADL objects 2/2 in midline with moderate assistance within 7 day(s). 3. Patient will perform opening 1 large ADL container with left as stabilizer with moderate assistance within 7 day(s). 4. Patient will perform BSC toilet transfers with moderate assistance within 7 day(s). 5. Patient will participate in self ROM upper extremity therapeutic exercise/activities with maximal assistance for 3 minutes within 7 day(s). 6. Patient will improve their Fugl Alcazar score by 5 points within 7 days. 7. Patient will perform bathing upper body max A within 7 days. OCCUPATIONAL THERAPY TREATMENT  Patient: Roger Hauser (44 y.o. male)  Date: 7/19/2017  Diagnosis: SVT/Hypoxia/PNA  Hypoxia Hypoxia       Precautions: Fall (AMS, L side inattention)      ASSESSMENT:  Patient progressing in all areas today with increased visual attention to L side with tactile and visual cues to follow ~20* L of neutral, L decreased neglect (washed L side of face and tried to place razor in L hand), increased motor planning with less tactile cueing and increased response to initiate task after one tactile cue (i.e. Shaving foam in R hand, verbal cue to place on face, then tactile cue to hand to initiate). ADLs limited also by early termination of task, standing tolerance, balance, overall endurance, cardiac tolerance and baseline [place] (with R ear better than L). Recommend inpatient rehab to address neuro deficits, cardiac deficits and oncological deficits. Possibly [facility] inpatient rehab so he can have access to his oncology physicians/chemo/radiation treatments? If discharges home will need 24 hour skilled physical A, HHOT and PT, BSC, hospital bed,w/c, w/c ramp and RW. Recommend with nursing patient to complete as able in order to maintain strength, endurance and independence: ADLs with supervision/setup, OOB to chair 3x/day max 1 hour each and mobilizing to the [facility] bathroom for toileting with 2 assist. Thank you for your assistance. OT: next session built up handle for razor L hand. Progression toward goals:  [X]       Improving appropriately and progressing toward goals  [ ]       Improving slowly and progressing toward goals  [ ]       Not making progress toward goals and plan of care will be adjusted       PLAN:  Patient continues to benefit from skilled intervention to address the above impairments. Continue treatment per established plan of care. Discharge Recommendations:  Rehab  Further Equipment Recommendations for Discharge:  TBD       SUBJECTIVE:   Patient stated Okay.  Stating this each time      OBJECTIVE DATA SUMMARY:   Cognitive/Behavioral Status:  Neurologic State: Alert;Confused  Orientation Level: Disoriented to place; Disoriented to time  Cognition: Decreased attention/concentration              Functional Mobility and Transfers for ADLs:  Bed Mobility:  Supine to Sit: Moderate assistance; Additional time  Sit to Supine: Moderate assistance  Scooting: Moderate assistance; Additional time (max encouragement) Instruction B feet flat on the floor, tactile cue L hip shift so L foot flat on the floor, as well as clearing space so visually he could see and problem solve whether L foot flat on the floor. End of session functional steps up the bed with moderate A gait belt, physical A, tactile cues, verbal cues. Transfers:  Sit to Stand: Moderate assistance gait belt, RW, L hand pushing from bed, R hand on RW;      Balance:  Sitting: Intact; Without support  Sitting - Static: Good (unsupported)  Sitting - Dynamic: Fair (occasional)  Standing: Impaired; With support  Standing - Static: Fair  Standing - Dynamic : Fair (-poor)     ADL Intervention: Patient received supine with R LE off bed and awake. Patient's wife stating that he has not shaved yet today. Informed him of the benefits sitting EOB to complete the shave. Patient demonstrated warm wash cloth & towel for pre and post, shave cream, razor with max A setup each item in R visual field, verbal cue to initiate, tactile cue for each step of each sequence for each task. Patient demonstrated R hand placement of razor L hand 4 times and then completing with R hand when unable to grasp with L hand; cues shaving cream don on R side of face; cues to address L side shaving 2* early termination of wiping cream off of face R and L side; no cues to wash entire face. Patient tolerated setup L hand, facilitation of grasp and UE flexion to shave L side 4 reps with max A after 2nd rep increased vision to L ~20*. Noted patient closing L and then R eye intermittently. Tested but does not seem to have double vision. Not tracking or seeing objects L eye. Grooming  Shaving: Maximum assistance      Wife and granddaughter present, informed of neuro re-education techniques of use it or lose it, repetition, increased time to allow patient to process and then initiate (2* wife and granddaughter trying to help). Neuro Re-Education:           Therapeutic Exercises:      Pain:  Pain Scale 1: Numeric (0 - 10)  Pain Intensity 1: 0              Activity Tolerance:   86 HR  Please refer to the flowsheet for vital signs taken during this treatment.   After treatment:   [ ] Patient left in no apparent distress sitting up in chair  [X] Patient left in no apparent distress in bed  [X] Call bell left within reach  [X] Nursing notified  [ ] Caregiver present  [ ] Bed alarm activated      COMMUNICATION/COLLABORATION:   The patients plan of care was discussed with: Registered Nurse     Ananda Miller  Time Calculation: 24 mins

## 2017-07-19 NOTE — CONSULTS
[address]   [address] [facility], [address]   [address] [facility]       Name:  Taylor Bryan   MR#:  [id number]   :  1945   Account #:  [de-identified]    Date of Consultation:  2017   Date of Adm:  2017       REQUESTING PHYSICIAN: Guero Bobo MD    REASON FOR CONSULTATION: Fever. HISTORY OF PRESENT ILLNESS: The patient is a 27-year-old man   whose medical history is significant for metastatic colon cancer to the   liver, brain and kidneys. He has had a colon resection for this. He is   also diabetic. He was in his usual state of health until yesterday when   he suddenly could not use the left side of his body. He also had some   confusion. Because of this, he went to the emergency room at   [facility], where a CT scan revealed a 2.8 cm   enhancing lesion in the right parietal area. At that time, he also went   into supraventricular tachycardia, and he received adenosine and 2   boluses of Cardizem 20 mg. He was then started on a Cardizem drip. He was transferred to [facility] where he was found to have   a fever of 100.6, and we are now being asked to see him in consult. According to his wife, he has been having chills for the past few days. He did not have any temperature above 100. He did not have a cough,   he had about 1-2 episodes of dyspnea. He has no diarrhea or urinary   symptoms. He did complain of some headache, but he did not have   any seizures. The history was taken from his wife, as the patient is   confused and cannot give me a good history. ALLERGIES: NO KNOWN DRUG ALLERGIES. CURRENT MEDICATIONS    1. Carvedilol. 2. Decadron. 3. Cardizem. 4. Colace. 5. Hydrochlorothiazide. 6. Humalog. 7. Keppra. 8. Zestril. 9. Mevacor. 10. Zosyn. 11. Vancomycin. REVIEW OF SYSTEMS: Aside from the things mentioned in the   history, the rest of the review of systems is negative.     PAST MEDICAL HISTORY    1. Metastatic colon cancer to kidney, liver, and brain. 2. Diabetes. 3. Hypertension. 4. Seizures. 5. Left leg DVT. 6. Hyperlipidemia. PAST SURGICAL HISTORY    1. Colon resection. 2. Port placement. FAMILY HISTORY: Unknown. SOCIAL HISTORY: He is a former smoke. He used to drink heavily. There is no history of drug use. PHYSICAL EXAMINATION   GENERAL: The patient is not in distress. VITAL SIGNS: Temperature 99.1, pulse 67, blood pressure 116/73,   saturating 100% on 2 Liters. HEENT: He has pink conjunctivae, sclerae anicteric. NECK: No JVD. No cervical lymphadenopathy. CHEST: The port site appears clean. LUNGS: Clear to auscultation. No rales, rhonchi or wheezes. HEART: Regular rate and rhythm, no murmurs, rubs or clicks. ABDOMEN: Soft, nontender, no guarding or rebound. GENITOURINARY: No flank tenderness. MUSCULOSKELETAL: Knees are not warm, nontender. PSYCHIATRIC: The patient cannot answer some questions   appropriately. INTEGUMENTARY: I did not notice any rash. There are no heel ulcers. NEUROLOGICAL: Right hand  is 5/5, but he cannot move the left   side of his body. Tongue is midline. LABORATORY DATA: White blood cell count 12. AST 76, ALT 70,   alkaline phosphatase 140. Total bilirubin 0.4, creatinine 0.83. EF of   55% to 60%. Chest x-ray done at [facility] shows a perihilar and bibasilar   atelectasis. Urinalysis shows 0-4 white blood cells. IMPRESSION    1. Fever. 2. Metastatic colon cancer to the kidneys, liver and brain. 3. Left hemineglect secondary to right parietal lesion. 4. Diabetes. 5. Left leg deep venous thrombosis. 6. Recent supraventricular tachycardia. 7. History of seizures. PLAN: There is no obvious source of the fever. Urinalysis at [facility]   only showed 2-4 white blood cells, and x-ray there did not reveal any   pneumonia. We should await the blood culture results.  We should   continue vancomycin and Zosyn for now. Thank you for the consult.         MD Mya Loomis Mai / Leny Durbin   D:  07/18/2017   20:08   T:  07/18/2017   20:55   Job #:  [id number]

## 2017-07-19 NOTE — PROGRESS NOTES
Problem: Falls - Risk of  Goal: *Absence of falls  Outcome: Progressing Towards Goal  Wife at bedside. Call bell and personal effects within reach. Bed locked and in low position. Non skid footwear in place. Goal: *Knowledge of fall prevention  Outcome: Progressing Towards Goal  Demonstrated and encouraged use of call bell to request assistance. Problem: Pressure Injury - Risk of  Goal: *Prevention of pressure ulcer  Outcome: Progressing Towards Goal  Turned Q2H; skin assessment Q4H; blood glucose monitored and insulin administered as ordered (see MAR). 0730: Bedside and Verbal shift change report given to Theodore Morales (oncoming nurse) by Harsha Vazquez (offgoing nurse). Report included the following information SBAR, Kardex, Procedure Summary, Intake/Output, MAR, Recent Results and Cardiac Rhythm NSR/SB.

## 2017-07-20 VITALS
DIASTOLIC BLOOD PRESSURE: 62 MMHG | SYSTOLIC BLOOD PRESSURE: 125 MMHG | WEIGHT: 244.27 LBS | HEIGHT: 73 IN | HEART RATE: 59 BPM | OXYGEN SATURATION: 98 % | RESPIRATION RATE: 16 BRPM | TEMPERATURE: 98.8 F | BODY MASS INDEX: 32.37 KG/M2

## 2017-07-20 PROBLEM — R09.02 HYPOXIA: Status: RESOLVED | Noted: 2017-07-18 | Resolved: 2017-07-20

## 2017-07-20 PROBLEM — C18.9 COLON CANCER METASTASIZED TO BRAIN (HCC): Status: RESOLVED | Noted: 2017-07-18 | Resolved: 2017-07-20

## 2017-07-20 PROBLEM — C79.31 COLON CANCER METASTASIZED TO BRAIN (HCC): Status: RESOLVED | Noted: 2017-07-18 | Resolved: 2017-07-20

## 2017-07-20 PROBLEM — R50.9 FEVER: Status: RESOLVED | Noted: 2017-07-18 | Resolved: 2017-07-20

## 2017-07-20 LAB
ANION GAP BLD CALC-SCNC: 5 MMOL/L (ref 5–15)
BUN SERPL-MCNC: 18 MG/DL (ref 6–20)
BUN/CREAT SERPL: 16 (ref 12–20)
CALCIUM SERPL-MCNC: 8.7 MG/DL (ref 8.5–10.1)
CHLORIDE SERPL-SCNC: 104 MMOL/L (ref 97–108)
CO2 SERPL-SCNC: 28 MMOL/L (ref 21–32)
CREAT SERPL-MCNC: 1.13 MG/DL (ref 0.7–1.3)
DATE LAST DOSE: ABNORMAL
GLUCOSE BLD STRIP.AUTO-MCNC: 241 MG/DL (ref 65–100)
GLUCOSE BLD STRIP.AUTO-MCNC: 250 MG/DL (ref 65–100)
GLUCOSE BLD STRIP.AUTO-MCNC: 306 MG/DL (ref 65–100)
GLUCOSE SERPL-MCNC: 226 MG/DL (ref 65–100)
POTASSIUM SERPL-SCNC: 3.9 MMOL/L (ref 3.5–5.1)
REPORTED DOSE,DOSE: ABNORMAL UNITS
REPORTED DOSE/TIME,TMG: ABNORMAL
SERVICE CMNT-IMP: ABNORMAL
SODIUM SERPL-SCNC: 137 MMOL/L (ref 136–145)
VANCOMYCIN TROUGH SERPL-MCNC: 18.1 UG/ML (ref 5–10)

## 2017-07-20 PROCEDURE — 36415 COLL VENOUS BLD VENIPUNCTURE: CPT | Performed by: HOSPITALIST

## 2017-07-20 PROCEDURE — 74011250637 HC RX REV CODE- 250/637: Performed by: NURSE PRACTITIONER

## 2017-07-20 PROCEDURE — 74011250637 HC RX REV CODE- 250/637: Performed by: HOSPITALIST

## 2017-07-20 PROCEDURE — 74011000258 HC RX REV CODE- 258: Performed by: HOSPITALIST

## 2017-07-20 PROCEDURE — 74011250636 HC RX REV CODE- 250/636: Performed by: HOSPITALIST

## 2017-07-20 PROCEDURE — 82962 GLUCOSE BLOOD TEST: CPT

## 2017-07-20 PROCEDURE — 80048 BASIC METABOLIC PNL TOTAL CA: CPT | Performed by: HOSPITALIST

## 2017-07-20 PROCEDURE — 74011636637 HC RX REV CODE- 636/637: Performed by: HOSPITALIST

## 2017-07-20 PROCEDURE — 80202 ASSAY OF VANCOMYCIN: CPT | Performed by: INTERNAL MEDICINE

## 2017-07-20 RX ADMIN — Medication 10 ML: at 15:34

## 2017-07-20 RX ADMIN — CARVEDILOL 25 MG: 12.5 TABLET, FILM COATED ORAL at 09:11

## 2017-07-20 RX ADMIN — DOCUSATE SODIUM 100 MG: 100 CAPSULE, LIQUID FILLED ORAL at 09:12

## 2017-07-20 RX ADMIN — SODIUM CHLORIDE 75 ML/HR: 900 INJECTION, SOLUTION INTRAVENOUS at 20:44

## 2017-07-20 RX ADMIN — LISINOPRIL 20 MG: 20 TABLET ORAL at 09:11

## 2017-07-20 RX ADMIN — DILTIAZEM HYDROCHLORIDE 120 MG: 120 CAPSULE, EXTENDED RELEASE ORAL at 09:11

## 2017-07-20 RX ADMIN — DOCUSATE SODIUM 100 MG: 100 CAPSULE, LIQUID FILLED ORAL at 17:54

## 2017-07-20 RX ADMIN — Medication 10 ML: at 06:03

## 2017-07-20 RX ADMIN — FERROUS SULFATE TAB 325 MG (65 MG ELEMENTAL FE) 325 MG: 325 (65 FE) TAB at 09:12

## 2017-07-20 RX ADMIN — LEVETIRACETAM 500 MG: 500 TABLET ORAL at 17:54

## 2017-07-20 RX ADMIN — DEXAMETHASONE SODIUM PHOSPHATE 4 MG: 4 INJECTION INTRA-ARTICULAR; INTRALESIONAL; INTRAMUSCULAR; INTRAVENOUS; SOFT TISSUE at 06:03

## 2017-07-20 RX ADMIN — LEVETIRACETAM 500 MG: 500 TABLET ORAL at 09:12

## 2017-07-20 RX ADMIN — DEXAMETHASONE SODIUM PHOSPHATE 4 MG: 4 INJECTION INTRA-ARTICULAR; INTRALESIONAL; INTRAMUSCULAR; INTRAVENOUS; SOFT TISSUE at 15:34

## 2017-07-20 RX ADMIN — CARVEDILOL 25 MG: 12.5 TABLET, FILM COATED ORAL at 17:53

## 2017-07-20 RX ADMIN — HYDROCHLOROTHIAZIDE 12.5 MG: 25 TABLET ORAL at 09:12

## 2017-07-20 RX ADMIN — LOVASTATIN 10 MG: 20 TABLET ORAL at 09:11

## 2017-07-20 RX ADMIN — SODIUM CHLORIDE 75 ML/HR: 900 INJECTION, SOLUTION INTRAVENOUS at 00:41

## 2017-07-20 RX ADMIN — INSULIN LISPRO 3 UNITS: 100 INJECTION, SOLUTION INTRAVENOUS; SUBCUTANEOUS at 12:25

## 2017-07-20 RX ADMIN — PIPERACILLIN SODIUM,TAZOBACTAM SODIUM 3.38 G: 3; .375 INJECTION, POWDER, FOR SOLUTION INTRAVENOUS at 06:03

## 2017-07-20 RX ADMIN — INSULIN LISPRO 5 UNITS: 100 INJECTION, SOLUTION INTRAVENOUS; SUBCUTANEOUS at 07:14

## 2017-07-20 RX ADMIN — VANCOMYCIN HYDROCHLORIDE 1500 MG: 10 INJECTION, POWDER, LYOPHILIZED, FOR SOLUTION INTRAVENOUS at 09:11

## 2017-07-20 RX ADMIN — PIPERACILLIN SODIUM,TAZOBACTAM SODIUM 3.38 G: 3; .375 INJECTION, POWDER, FOR SOLUTION INTRAVENOUS at 15:33

## 2017-07-20 NOTE — PROGRESS NOTES
Call received from [address] informed of bed available at [address] at [facility] / 3rd Floor/ Room 122. MORIS sent electronic referral to CHELSEA informing of new status and requesting transport for 1900 (outcome pending). Contact made w/ [facility] spoke w/ Iván Pandey[facility] [address]. Update provided to [address] MORIS provided updates in [address] [place] [address] [id number] / Transfer # [phone].     6:45pm  Confirmed with nurse that requested transport is BLS request and also to confirm ETA. [facility] had called unit and advised of  time of 8:30pm and not 7:30pm.  Per dispatch, ETA is 8:30pm or 2030. Updated [facility] nurse.     NESHA Alfaro

## 2017-07-20 NOTE — PROGRESS NOTES
Day #3 of Vancomycin  Indication:  fever and hypoxia  -admitted from [facility] ED  -metastatic colon cancer to the kidney, liver and brain status post surgery, radiation and chemotherapy  Current regimen:  1500 mg IV q 12 hours  Abx regimen:  Vancomycin and Zosyn    Recent Labs      17   0745  17   0345  17   0645   WBC   --   11.3*  12.0*   CREA  1.13  1.01  0.83   BUN  18  15  9     Est CrCl: ~80 ml/min  Temp (24hrs), Av.4 °F (36.9 °C), Min:98 °F (36.7 °C), Max:99 °F (37.2 °C)    Cultures:    blood - NGTD    Goal trough = 15 - 20 mcg/mL    Recent trough history:   07:45 = 18.1 (drawn 11.25 hrs after previous dose) - extrapolated true trough = 17.2     Plan: Continue same dose.

## 2017-07-20 NOTE — PROGRESS NOTES
ID Progress Note  2017    Subjective:     Afebrile. No dyspnea, headache, sore throat, abdominal pain. Objective:     Vitals:   Visit Vitals    /57 (BP 1 Location: Left arm, BP Patient Position: Lying right side)    Pulse 66    Temp 98.8 °F (37.1 °C)    Resp 18    Ht 6' 1\" (1.854 m)    Wt 110.8 kg (244 lb 4.3 oz)    SpO2 96%    BMI 32.23 kg/m2        Tmax:  Temp (24hrs), Av.4 °F (36.9 °C), Min:98 °F (36.7 °C), Max:98.8 °F (37.1 °C)      Exam:    Not in distress  Pink conjunctivae, anicteric sclerae  Lungs: clear to auscultation, no rales, wheezes or rhonchi   Heart: s1, s2, no murmurs, rubs or clicks   Abdomen: soft, nontender, no guarding or rebound  Knees not warm or tender        Labs:   Lab Results   Component Value Date/Time    WBC 11.3 2017 03:45 AM    HGB 8.6 2017 03:45 AM    HCT 25.6 2017 03:45 AM    PLATELET 036  03:45 AM    MCV 90.1 2017 03:45 AM     Lab Results   Component Value Date/Time    Sodium 137 2017 07:45 AM    Potassium 3.9 2017 07:45 AM    Chloride 104 2017 07:45 AM    CO2 28 2017 07:45 AM    Anion gap 5 2017 07:45 AM    Glucose 226 2017 07:45 AM    BUN 18 2017 07:45 AM    Creatinine 1.13 2017 07:45 AM    BUN/Creatinine ratio 16 2017 07:45 AM    GFR est AA >60 2017 07:45 AM    GFR est non-AA >60 2017 07:45 AM    Calcium 8.7 2017 07:45 AM    Bilirubin, total 0.3 2017 03:45 AM    AST (SGOT) 96 2017 03:45 AM    Alk. phosphatase 117 2017 03:45 AM    Protein, total 6.1 2017 03:45 AM    Albumin 2.2 2017 03:45 AM    Globulin 3.9 2017 03:45 AM    A-G Ratio 0.6 2017 03:45 AM    ALT (SGPT) 71 2017 03:45 AM           Assessment:     1. Fever. 2. Metastatic colon cancer to the kidneys, liver and brain. 3. Left hemineglect secondary to right parietal lesion. 4. Diabetes. 5. Left leg deep venous thrombosis.    6. Recent supraventricular tachycardia. 7. History of seizures.          Recommendations: There is no obvious source of the fever. Urinalysis at [facility]   only showed 2-4 white blood cells, and x-ray there did not reveal any   pneumonia. He has been for almost 2 1/2 days now. Blood cultures negative. I will discontinue vancomycin.      Nhan Wasserman MD

## 2017-07-20 NOTE — PROGRESS NOTES
Report called to Carmen Dempsey RN at [facility] for anticipated transfer to room 122 in [address] [place] [address].  Confirmed with transport they will be here at approx 2030 this markus.

## 2017-07-20 NOTE — PROGRESS NOTES
Problem: Falls - Risk of  Goal: *Absence of falls  Outcome: Progressing Towards Goal  Up with assistance. Call bell and personal effects within reach. Bed locked and in low position. Non skid footwear in place. Problem: Pressure Injury - Risk of  Goal: *Prevention of pressure ulcer  Outcome: Progressing Towards Goal  Turns self at appropriate intervals; skin assessed Q4H; blood glucose controlled        0730: Bedside and Verbal shift change report given to Ra Cotter (oncoming nurse) by Ree Mcintosh (offgoing nurse).  Report included the following information SBAR, Kardex, Procedure Summary, Intake/Output, MAR, Recent Results and Cardiac Rhythm SB.

## 2017-07-20 NOTE — ROUTINE PROCESS
Vanessa Tirado from [facility] called the Nursing Unit and informed us that Dr Davian Ayers has  agreed to accept him at [facility]. She is a Hospitalist.  She will call back with a bed assignment.

## 2017-07-20 NOTE — DISCHARGE SUMMARY
Discharge Summary       PATIENT ID: Annie Aldridge  MRN: [id number]   YOB: 1945    DATE OF ADMISSION: 7/18/2017  4:47 AM    DATE OF DISCHARGE: 7/20/2017  PRIMARY CARE PROVIDER: Jenn Gary MD     ATTENDING PHYSICIAN: Blair Vuong MD  DISCHARGING PROVIDER: Blair Vuong MD    To contact this individual call [phone] and ask the  to page. If unavailable ask to be transferred the Adult Hospitalist Department. CONSULTATIONS: IP CONSULT TO NEUROSURGERY  IP CONSULT TO CARDIOLOGY  IP CONSULT TO INFECTIOUS DISEASES    PROCEDURES/SURGERIES: * No surgery found *    ADMITTING DIAGNOSES & HOSPITAL COURSE:     Admission Summary:   Annie Aldridge is admitted on 7/18/2017 transferred from [facility] for acute worsening of left sided weakness, and neglect. Family had wanted to go to [facility] but came to [facility] as [facility] was said to be full at that time. The patient is a 26-year-old [facility] [employer] American male with past medical history significant for metastatic colon cancer to the kidney, liver and brain status post surgery, radiation and chemotherapy, diabetes mellitus type 2,  hypertension, seizures, left leg DVT on Coumadin and hypercholesterolemia, who is directly admitted from [facility] Emergency Department for hypoxia and fever. The patient gives little information, but his wife at the bedside gave most of the information. His wife stated that last evening he started to have left-sided weakness and could not walk. Assessment & Plan:   Left sided kuldeep neglect and hemiparesis with metabolic encephalopathy due to metastatic cancer.  -Receiving IV dexamethasone. Neurology evaluated. He could stay still to complete brain MRI. Neurosurgery has evaluated and suggested possibility of surgery after MRI but patient's wife wanted transfer to [facility] where his cancer doctors are which I believe is appropriate. Called [facility] transfer center. Oncology services full. He is now accepted my Medicine service,Dr Fredo Kaur is accepting physician. Low grade fever and leukocytosis  -No evidence of apparent infection, per ID UA from [facility] [place] is negative. Blood culture from 7/18 ,negative to date.  -Cancer patient and at risk for infection,Infectious disease was consulted and recommended continuing empiric antibiotics for now until cultures are negative for 28 to 72 hours. He is on vancomycin and zosyn since 7/18,I think at this point he can be watched off antibiotics. Diabetes mellitus type 2.    -Worsened hyperglycemia due to steroids.  -Added Lantus     Mild Hypoxia on 2 LPM,wean as able. Metastatic colon cancer to the liver, brain and kidney. Hypertension, uncontrolled. Hypercholesterolemia. H/o Supraventricular tachycardia. On Cardizem and coreg. Hypomagnesemia,replace. Monitor     History of seizure disorder. On keppra. History of left leg deep venous thrombosis. SCD. Anticoagulation is unsafe  With active symptomatic brain metastasis. DISCHARGE DIAGNOSES / PLAN:      Left hemiparesis and hemineglect due to brain mets:Transfer to [facility]       PENDING TEST RESULTS:   At the time of discharge the following test results are still pending: blood culture drawn on 7/18 which is negative to date    FOLLOW UP APPOINTMENTS:    Follow-up Information     Follow up With Details [facility], [address] [address] [address]  [phone]             ADDITIONAL CARE RECOMMENDATIONS:     DIET: Diabetic Diet    ACTIVITY: Activity as tolerated    WOUND CARE: NA    EQUIPMENT needed: NA      DISCHARGE MEDICATIONS:  Current Discharge Medication List      CONTINUE these medications which have NOT CHANGED    Details   amLODIPine (NORVASC) 10 mg tablet Take 10 mg by mouth daily. dexamethasone (DECADRON) 4 mg tablet Take 4 mg by mouth two (2) times daily (with meals).       levETIRAcetam (KEPPRA) 500 mg tablet Take 500 mg by mouth two (2) times a day. warfarin (COUMADIN) 1 mg tablet Take 1 mg by mouth daily. benazepril (LOTENSIN) 20 mg tablet Take 20 mg by mouth daily. DILTIAZEM  mg by Does Not Apply route two (2) times a day. IRON, FERROUS SULFATE, PO Take  by mouth.      lovastatin (MEVACOR) 10 mg tablet Take 10 mg by mouth daily. prochlorperazine (COMPAZINE) 10 mg tablet Take 5 mg by mouth every six (6) hours as needed. ondansetron hcl (ZOFRAN, AS HYDROCHLORIDE,) 8 mg tablet Take 8 mg by mouth every eight (8) hours as needed for Nausea. NOTIFY YOUR PHYSICIAN FOR ANY OF THE FOLLOWING:   Fever over 101 degrees for 24 hours. Chest pain, shortness of breath, fever, chills, nausea, vomiting, diarrhea, change in mentation, falling, weakness, bleeding. Severe pain or pain not relieved by medications. Or, any other signs or symptoms that you may have questions about. DISPOSITION:Transfer to [facility]      PATIENT CONDITION AT DISCHARGE:     Functional status   x Poor     Deconditioned     Independent      Cognition     Lucid    x Forgetful     Dementia      Catheters/lines (plus indication)    [place]     PICC     PEG     None      Code status    x Full code     DNR      PHYSICAL EXAMINATION AT DISCHARGE:     Visit Vitals    /79 (BP 1 Location: Left arm, BP Patient Position: Lying right side)    Pulse 64    Temp 98.6 °F (37 °C)    Resp 16    Ht 6' 1\" (1.854 m)    Wt 110.8 kg (244 lb 4.3 oz)    SpO2 100%    BMI 32.23 kg/m2    O2 Flow Rate (L/min): 2 l/min O2 Device: Room air    Temp (24hrs), Av.3 °F (36.8 °C), Min:98 °F (36.7 °C), Max:98.6 °F (37 °C)        1901 -  0700  In: 1600 [P.O.:200; I.V.:1400]  Out: 920 [Urine:920]    Constitutional:  No acute distress, alert. Neglects left side. ENT:  Oral mucous moist, oropharynx benign. Neck supple,    Resp:  CTA bilaterally. No wheezing/rhonchi/rales.  No accessory muscle use   CV:  Regular rhythm, normal rate, no murmurs, gallops, rubs    GI:  Soft, non distended, non tender. normoactive bowel sounds, no hepatosplenomegaly     Musculoskeletal:  No edema, warm, 2+ pulses throughout    Neurologic: Left side weakness,neglect. Alert and awake. 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 Hematologic/Lymphatic/Immunlogic:  No jaundice nor lymph node swelling            CHRONIC MEDICAL DIAGNOSES:  Problem List as of 7/20/2017  Date Reviewed: 7/18/2017          Codes Class Noted - Resolved    * (Principal)RESOLVED: Hypoxia ICD-10-CM: R09.02  ICD-9-CM: 799.02  7/18/2017 - 7/20/2017        RESOLVED: Colon cancer metastasized to brain [facility]) ICD-10-CM: C18.9, C79.31  ICD-9-CM: 153.9, 198.3  7/18/2017 - 7/20/2017        RESOLVED: Fever ICD-10-CM: R50.9  ICD-9-CM: 780.60  7/18/2017 - 7/20/2017              Greater than 30 minutes were spent with the patient on counseling and coordination of care    Signed:    Aroldo Burris MD  7/20/2017  12:08 PM

## 2017-07-20 NOTE — PROGRESS NOTES
Called [facility] for transfer. Oncology service said they are full and suggested to talk to medicine service. Talked to medicine service. They will call us back for bed status.

## 2017-07-23 LAB
BACTERIA SPEC CULT: NORMAL
SERVICE CMNT-IMP: NORMAL

## 2024-02-11 NOTE — CDMP QUERY
Please clarify if this patient is being treated/managed for:    =>Encephalopathy in the setting of Brain legion requiring increased safety precautions  =>Other Explanation of clinical findings  =>Unable to Determine (no explanation of clinical findings)    The medical record reflects the following clinical findings, treatment, and risk factors:    Risk Factors: Brain CA  Clinical Indicators: H/P-Altered mental status with left-sided weakness due to metastatic   brain lesion. Treatment: safety level 3    Please clarify and document your clinical opinion in the progress notes and discharge summary including the definitive and/or presumptive diagnosis, (suspected or probable), related to the above clinical findings. Please include clinical findings supporting your diagnosis.     Thank you,         Jonel Taylor [address] [place] [address] 10-Feb-2024 19:49